# Patient Record
Sex: FEMALE | Race: OTHER | Employment: FULL TIME | URBAN - METROPOLITAN AREA
[De-identification: names, ages, dates, MRNs, and addresses within clinical notes are randomized per-mention and may not be internally consistent; named-entity substitution may affect disease eponyms.]

---

## 2018-02-28 ENCOUNTER — MED REC SCAN ONLY (OUTPATIENT)
Dept: INTERNAL MEDICINE CLINIC | Facility: CLINIC | Age: 48
End: 2018-02-28

## 2018-03-23 ENCOUNTER — HOSPITAL ENCOUNTER (OUTPATIENT)
Age: 48
Discharge: HOME | End: 2018-03-23
Attending: EMERGENCY MEDICINE
Payer: COMMERCIAL

## 2018-03-23 VITALS
RESPIRATION RATE: 20 BRPM | WEIGHT: 118 LBS | DIASTOLIC BLOOD PRESSURE: 63 MMHG | SYSTOLIC BLOOD PRESSURE: 130 MMHG | OXYGEN SATURATION: 98 % | TEMPERATURE: 98 F | HEART RATE: 75 BPM

## 2018-03-23 DIAGNOSIS — H81.10 BENIGN PAROXYSMAL POSITIONAL VERTIGO, UNSPECIFIED LATERALITY: Primary | ICD-10-CM

## 2018-03-23 PROCEDURE — 99204 OFFICE O/P NEW MOD 45 MIN: CPT

## 2018-03-23 PROCEDURE — 99203 OFFICE O/P NEW LOW 30 MIN: CPT

## 2018-03-23 RX ORDER — MECLIZINE HYDROCHLORIDE 25 MG/1
25 TABLET ORAL 3 TIMES DAILY PRN
Qty: 20 TABLET | Refills: 0 | Status: SHIPPED | OUTPATIENT
Start: 2018-03-23 | End: 2018-11-07

## 2018-03-23 NOTE — ED PROVIDER NOTES
Patient Seen in: [facility] In [address] [place] [address]    History   Patient presents with:  Nausea/Vomiting/Diarrhea (gastrointestinal)    Stated Complaint: nausea, vomiting, lt leg pain     HPI    44-year-old female patient presents her complaini injuries  Neuro: Normal speech, nonfocal examination and a symmetric motor, sensory, reflexes, cerebellar exam.  Normal gait and speech.   Psych: Appropriate, not agitated      ED Course   Labs Reviewed - No data to display    ED Course as of Mar 23 1031  -

## 2018-03-23 NOTE — ED INITIAL ASSESSMENT (HPI)
Pt with hx of vertigo reports dizziness (like the room is spinning) when moving head, nausea and one episode of vomiting this morning. Denies chest pain or shortness of breath.

## 2018-11-07 ENCOUNTER — OFFICE VISIT (OUTPATIENT)
Dept: INTERNAL MEDICINE CLINIC | Facility: CLINIC | Age: 48
End: 2018-11-07
Payer: COMMERCIAL

## 2018-11-07 VITALS
DIASTOLIC BLOOD PRESSURE: 69 MMHG | BODY MASS INDEX: 19.89 KG/M2 | SYSTOLIC BLOOD PRESSURE: 110 MMHG | WEIGHT: 119.38 LBS | TEMPERATURE: 98 F | HEIGHT: 65 IN | RESPIRATION RATE: 16 BRPM | HEART RATE: 88 BPM

## 2018-11-07 DIAGNOSIS — R74.8 ELEVATED LIVER ENZYMES: ICD-10-CM

## 2018-11-07 DIAGNOSIS — R10.11 RIGHT UPPER QUADRANT ABDOMINAL PAIN: ICD-10-CM

## 2018-11-07 DIAGNOSIS — Z00.00 ANNUAL PHYSICAL EXAM: Primary | ICD-10-CM

## 2018-11-07 PROCEDURE — 99396 PREV VISIT EST AGE 40-64: CPT | Performed by: INTERNAL MEDICINE

## 2018-11-07 NOTE — PROGRESS NOTES
Patient ID: Neyda Diana is a 50year old female. Patient presents with:  Physical       HISTORY OF PRESENT ILLNESS:   HPI  Patient presents for above. Here for annual physical and to discuss other medical problems.   Patient presents with her  wh strain: Not on file      Food insecurity - worry: Not on file      Food insecurity - inability: Not on file      Transportation needs - medical: Not on file      Transportation needs - non-medical: Not on file    Occupational History      Not on file    To abdominal pain  · US LIVER (CPT=76705); Future  · Prefer to hold off on CAT scan given she is childbearing age still. · Symptoms also do not appear concerning for serious pathology at this time.     Return in about 1 year (around 11/7/2019) for Complete ph

## 2018-11-07 NOTE — PATIENT INSTRUCTIONS
Prevention Guidelines, Women Ages 36 to 52  Screening tests and vaccines are an important part of managing your health. A screening test is done to find possible disorders or diseases in people who don't have any symptoms.  The goal is to find a disease e Depression All women in this age group At routine exams   Gonorrhea Sexually active women at increased risk for infection At routine exams   Hepatitis C Anyone at increased risk; 1 time for those born between [facility] At routine exams   High cholester Meningococcal Women at increased risk for infection–talk with your healthcare provider 1 or more doses   Pneumococcal conjugate vaccine (PCV13) and pneumococcal polysaccharide vaccine (PPSV23) Women at increased risk for infection–talk with your healthcare The exact cause of your abdominal (stomach) pain is not clear. This does not mean that this is something to worry about.  Everyone likes to know the exact cause of the problem, but sometimes with abdominal pain, there is no clear-cut cause, and this could b · Water is important so you do not get dehydrated. Soup may also be good. Sports drinks may also help, especially if they are not too acidic. Make sure you don't drink sugary drinks as this can make things worse. Take liquids in small amounts.  Do not guzzl © 9363-8689 [employer] [address]. [address] [facility], [facility] [place]. All rights reserved. This information is not intended as a substitute for professional medical care. Always follow your healthcare professional's instructions.

## 2019-03-05 ENCOUNTER — OFFICE VISIT (OUTPATIENT)
Dept: FAMILY MEDICINE CLINIC | Facility: CLINIC | Age: 49
End: 2019-03-05
Payer: COMMERCIAL

## 2019-03-05 VITALS
RESPIRATION RATE: 20 BRPM | TEMPERATURE: 101 F | HEIGHT: 61 IN | WEIGHT: 117 LBS | SYSTOLIC BLOOD PRESSURE: 120 MMHG | HEART RATE: 90 BPM | BODY MASS INDEX: 22.09 KG/M2 | DIASTOLIC BLOOD PRESSURE: 76 MMHG

## 2019-03-05 DIAGNOSIS — J06.9 ACUTE URI: ICD-10-CM

## 2019-03-05 PROCEDURE — 99213 OFFICE O/P EST LOW 20 MIN: CPT | Performed by: FAMILY MEDICINE

## 2019-03-05 PROCEDURE — 99212 OFFICE O/P EST SF 10 MIN: CPT | Performed by: FAMILY MEDICINE

## 2019-03-05 RX ORDER — AZITHROMYCIN 250 MG/1
TABLET, FILM COATED ORAL
Qty: 6 TABLET | Refills: 0 | Status: SHIPPED | OUTPATIENT
Start: 2019-03-05 | End: 2019-09-18

## 2019-03-05 NOTE — PROGRESS NOTES
HPI:    Patient ID: Breanna Armenta is a 52year old female. Pt presents with cold symptoms for 2-3 days. Pt has had cough, cold symptoms, sore throat, and fevers. Pt has tried otc remedies without relief. Pt states no sick contacts.            Review of Sy days.       Imaging & Referrals:  None       YQ#[id number]

## 2019-07-27 ENCOUNTER — HOSPITAL ENCOUNTER (OUTPATIENT)
Dept: MAMMOGRAPHY | Facility: HOSPITAL | Age: 49
Discharge: HOME | End: 2019-07-27
Attending: INTERNAL MEDICINE
Payer: COMMERCIAL

## 2019-07-27 DIAGNOSIS — Z00.00 ANNUAL PHYSICAL EXAM: ICD-10-CM

## 2019-07-27 PROCEDURE — 77067 SCR MAMMO BI INCL CAD: CPT | Performed by: INTERNAL MEDICINE

## 2019-07-27 PROCEDURE — 77063 BREAST TOMOSYNTHESIS BI: CPT | Performed by: INTERNAL MEDICINE

## 2019-07-30 ENCOUNTER — TELEPHONE (OUTPATIENT)
Dept: FAMILY MEDICINE CLINIC | Facility: CLINIC | Age: 49
End: 2019-07-30

## 2019-07-30 NOTE — TELEPHONE ENCOUNTER
Result Notes for [facility] MATILDA 2D+3D SCREENING BILAT (CPT=77067/99908)     Notes recorded by New Torres MD on 7/29/2019 at 3:17 PM CDT  Results normal/stable.  Continue present management.  Will recheck at follow-up visit.  ------    Notes recorded by Doris Albrecht

## 2019-07-30 NOTE — TELEPHONE ENCOUNTER
Pt  is requesting a call back from the nurse to discuss pt MAMMO results  is on [address] [place] [address]

## 2019-09-03 ENCOUNTER — NURSE TRIAGE (OUTPATIENT)
Dept: FAMILY MEDICINE CLINIC | Facility: CLINIC | Age: 49
End: 2019-09-03

## 2019-09-03 NOTE — TELEPHONE ENCOUNTER
Action Requested: Summary for Provider     []  Critical Lab, Recommendations Needed  [] Need Additional Advice  []   FYI    []   Need Orders  [] Need Medications Sent to Pharmacy  []  Other     SUMMARY:  Per protocol advised OV within 2 weeks.  Per ,

## 2019-09-18 ENCOUNTER — OFFICE VISIT (OUTPATIENT)
Dept: FAMILY MEDICINE CLINIC | Facility: CLINIC | Age: 49
End: 2019-09-18
Payer: COMMERCIAL

## 2019-09-18 VITALS
SYSTOLIC BLOOD PRESSURE: 106 MMHG | DIASTOLIC BLOOD PRESSURE: 71 MMHG | WEIGHT: 118 LBS | RESPIRATION RATE: 18 BRPM | HEART RATE: 61 BPM | BODY MASS INDEX: 23.16 KG/M2 | HEIGHT: 60 IN | TEMPERATURE: 98 F

## 2019-09-18 DIAGNOSIS — R10.11 RIGHT UPPER QUADRANT ABDOMINAL PAIN: Primary | ICD-10-CM

## 2019-09-18 PROCEDURE — 99213 OFFICE O/P EST LOW 20 MIN: CPT | Performed by: FAMILY MEDICINE

## 2019-09-18 NOTE — PROGRESS NOTES
HPI:    Patient ID: Lily Nolasco is a 52year old female. Pt presents with intermittent right upper abdominal pains usually at night that began a few months ago. No associated with eating. No injury or trauma. Pt states this occurs a few times per week. encounter.       Meds This Visit:  Requested Prescriptions      No prescriptions requested or ordered in this encounter       Imaging & Referrals:  GASTRO - INTERNAL  US ABDOMEN COMPLETE (CPT=76700)       IX#[id number]

## 2019-10-29 ENCOUNTER — TELEPHONE (OUTPATIENT)
Dept: FAMILY MEDICINE CLINIC | Facility: CLINIC | Age: 49
End: 2019-10-29

## 2019-10-29 NOTE — TELEPHONE ENCOUNTER
Yanna calling to check the status states she have not receive d the fax.       Patient is still there waiting

## 2019-10-29 NOTE — TELEPHONE ENCOUNTER
Janet Leon from [address] [place] [address] she stated the Patient is there for the [employer] and would like to know if the order can be faxed over to   [phone] site was told order would be faxed over

## 2019-11-07 ENCOUNTER — TELEPHONE (OUTPATIENT)
Dept: FAMILY MEDICINE CLINIC | Facility: CLINIC | Age: 49
End: 2019-11-07

## 2019-11-07 NOTE — TELEPHONE ENCOUNTER
Received blood testing from outside lab and tests were unremarkable. Pt contacted and discussed labs with  who will translate. Pt has follow up appt planned. Can discuss further.

## 2019-11-21 ENCOUNTER — OFFICE VISIT (OUTPATIENT)
Dept: FAMILY MEDICINE CLINIC | Facility: CLINIC | Age: 49
End: 2019-11-21
Payer: COMMERCIAL

## 2019-11-21 VITALS
SYSTOLIC BLOOD PRESSURE: 107 MMHG | DIASTOLIC BLOOD PRESSURE: 69 MMHG | WEIGHT: 119 LBS | RESPIRATION RATE: 18 BRPM | BODY MASS INDEX: 23.36 KG/M2 | TEMPERATURE: 98 F | HEIGHT: 60 IN | HEART RATE: 76 BPM

## 2019-11-21 DIAGNOSIS — N20.0 KIDNEY STONE: Primary | ICD-10-CM

## 2019-11-21 PROCEDURE — 99213 OFFICE O/P EST LOW 20 MIN: CPT | Performed by: FAMILY MEDICINE

## 2019-11-21 RX ORDER — NAPROXEN 500 MG/1
500 TABLET ORAL 2 TIMES DAILY WITH MEALS
Qty: 30 TABLET | Refills: 0 | Status: SHIPPED | OUTPATIENT
Start: 2019-11-21 | End: 2019-12-11 | Stop reason: ALTCHOICE

## 2019-11-21 NOTE — PROGRESS NOTES
HPI:    Patient ID: Tala Glavan is a 52year old female. Pt presents for follow up on her RUQ pains. Had u/s done at outside facility and is here to discuss results. Pt's u/s showed 2 stones of right kidney non-obstructing.   states pt has no sig

## 2019-12-11 ENCOUNTER — OFFICE VISIT (OUTPATIENT)
Dept: FAMILY MEDICINE CLINIC | Facility: CLINIC | Age: 49
End: 2019-12-11
Payer: COMMERCIAL

## 2019-12-11 VITALS
DIASTOLIC BLOOD PRESSURE: 71 MMHG | HEART RATE: 65 BPM | TEMPERATURE: 97 F | WEIGHT: 118 LBS | HEIGHT: 60 IN | SYSTOLIC BLOOD PRESSURE: 112 MMHG | BODY MASS INDEX: 23.16 KG/M2

## 2019-12-11 DIAGNOSIS — J06.9 UPPER RESPIRATORY INFECTION, ACUTE: Primary | ICD-10-CM

## 2019-12-11 PROCEDURE — 99213 OFFICE O/P EST LOW 20 MIN: CPT | Performed by: FAMILY MEDICINE

## 2019-12-11 RX ORDER — AZITHROMYCIN 250 MG/1
TABLET, FILM COATED ORAL
Qty: 6 TABLET | Refills: 0 | Status: SHIPPED | OUTPATIENT
Start: 2019-12-11 | End: 2020-03-21

## 2019-12-11 NOTE — PROGRESS NOTES
HPI:    Patient ID: Radha Tyson is a 52year old female. Patient with nasal congestion, cough with phlegm      Review of Systems   Constitutional: Positive for fatigue. Negative for activity change, chills and diaphoresis.    HENT: Positive for congesti

## 2020-03-21 ENCOUNTER — OFFICE VISIT (OUTPATIENT)
Dept: FAMILY MEDICINE CLINIC | Facility: CLINIC | Age: 50
End: 2020-03-21
Payer: COMMERCIAL

## 2020-03-21 VITALS
DIASTOLIC BLOOD PRESSURE: 77 MMHG | SYSTOLIC BLOOD PRESSURE: 143 MMHG | HEIGHT: 60 IN | RESPIRATION RATE: 20 BRPM | TEMPERATURE: 98 F | WEIGHT: 116 LBS | BODY MASS INDEX: 22.78 KG/M2 | HEART RATE: 101 BPM

## 2020-03-21 DIAGNOSIS — R11.2 NON-INTRACTABLE VOMITING WITH NAUSEA, UNSPECIFIED VOMITING TYPE: ICD-10-CM

## 2020-03-21 PROCEDURE — 99213 OFFICE O/P EST LOW 20 MIN: CPT | Performed by: FAMILY MEDICINE

## 2020-03-21 RX ORDER — ONDANSETRON 4 MG/1
4 TABLET, FILM COATED ORAL EVERY 8 HOURS PRN
Qty: 20 TABLET | Refills: 0 | Status: SHIPPED | OUTPATIENT
Start: 2020-03-21 | End: 2021-03-26

## 2020-03-21 NOTE — PROGRESS NOTES
HPI:    Patient ID: Vale Kelley is a 48year old female. Pt presents with nausea and episode of vomiting. Pt has had no fever/ diarrhea. Has had a moderate headache. Pt has not   been sleeping well and has been stressed.  No fevers and no sig abdominal

## 2020-04-04 ENCOUNTER — TELEPHONE (OUTPATIENT)
Dept: FAMILY MEDICINE CLINIC | Facility: CLINIC | Age: 50
End: 2020-04-04

## 2020-04-04 NOTE — TELEPHONE ENCOUNTER
Patient's , Adrianna Kelly is requesting a note from Dr. Sanna Melgar for an extension for patient to stay home from work until May 9th.  states he spoken to patient's employer regarding FMLA and employer states patient needs a letter to stay home from work. Please advise.

## 2020-04-06 NOTE — TELEPHONE ENCOUNTER
Message noted and letter generated as requested. Sent to patient via [facility]. Can notify pt's .

## 2020-04-13 ENCOUNTER — TELEPHONE (OUTPATIENT)
Dept: FAMILY MEDICINE CLINIC | Facility: CLINIC | Age: 50
End: 2020-04-13

## 2020-04-15 ENCOUNTER — MED REC SCAN ONLY (OUTPATIENT)
Dept: FAMILY MEDICINE CLINIC | Facility: CLINIC | Age: 50
End: 2020-04-15

## 2020-04-15 NOTE — TELEPHONE ENCOUNTER
Patients  calling to follow up on [facility] paperwork. Asking for a call to discuss. States paper work is not for [employer]" its for absence without pay.

## 2020-04-17 NOTE — TELEPHONE ENCOUNTER
[employer] FMLA form recvd and logged for processing (10-15 business day turnaround). Sent [employer] for HIPAA/fee.

## 2020-04-21 NOTE — TELEPHONE ENCOUNTER
Patient states the HIPPA release document was returned to Dr. Ronda Kapadia  And the FMLA forms must be faxed her the patient's employers office the last date of submission for her FMLA documents is 4/26/20

## 2020-04-23 ENCOUNTER — PATIENT MESSAGE (OUTPATIENT)
Dept: FAMILY MEDICINE CLINIC | Facility: CLINIC | Age: 50
End: 2020-04-23

## 2020-04-23 NOTE — TELEPHONE ENCOUNTER
Called spoke with pt in regards to fmla forms, notified pt hippa forms were received thru [facility], we do not have any fmla forms, pt states will come in today to drop forms off

## 2020-04-24 ENCOUNTER — MED REC SCAN ONLY (OUTPATIENT)
Dept: FAMILY MEDICINE CLINIC | Facility: CLINIC | Age: 50
End: 2020-04-24

## 2020-04-24 NOTE — TELEPHONE ENCOUNTER
From: Shane Ye  To: Sekou Mcguire MD  Sent: 4/23/2020 4:18 PM CDT  Subject: Other    hi  this is Shane Ye earlier today i dropped my FMLA documents at your office by mistake i put wrong date of my first day of vacation which needs to be corrected

## 2020-04-27 NOTE — TELEPHONE ENCOUNTER
Dr. Axel Skiff,    Please sign off on form: Disab 4/11/2020-5/9/2020  -Highlight the patient and hit \"Chart\" button. -In Chart Review, w/in the Encounter tab - click 1 time on the Telephone call encounter for 4/13/2020. Scroll down the telephone encounter.   -C

## 2020-05-06 ENCOUNTER — TELEPHONE (OUTPATIENT)
Dept: FAMILY MEDICINE CLINIC | Facility: CLINIC | Age: 50
End: 2020-05-06

## 2020-05-06 NOTE — TELEPHONE ENCOUNTER
Patient's , Pilo Phan, is requesting an extension for patient's leave from dates 05/11/2020 to 06/22/2020.  states extension needs to be faxed to [phone] ([facility]).

## 2020-05-07 ENCOUNTER — PATIENT MESSAGE (OUTPATIENT)
Dept: FAMILY MEDICINE CLINIC | Facility: CLINIC | Age: 50
End: 2020-05-07

## 2020-05-07 ENCOUNTER — TELEMEDICINE (OUTPATIENT)
Dept: FAMILY MEDICINE CLINIC | Facility: CLINIC | Age: 50
End: 2020-05-07

## 2020-05-07 ENCOUNTER — TELEPHONE (OUTPATIENT)
Dept: FAMILY MEDICINE CLINIC | Facility: CLINIC | Age: 50
End: 2020-05-07

## 2020-05-07 VITALS — SYSTOLIC BLOOD PRESSURE: 130 MMHG | TEMPERATURE: 98 F | DIASTOLIC BLOOD PRESSURE: 85 MMHG

## 2020-05-07 DIAGNOSIS — F41.9 ANXIETY: ICD-10-CM

## 2020-05-07 PROCEDURE — 99213 OFFICE O/P EST LOW 20 MIN: CPT | Performed by: FAMILY MEDICINE

## 2020-05-07 RX ORDER — ALPRAZOLAM 0.25 MG/1
0.25 TABLET ORAL NIGHTLY PRN
Qty: 30 TABLET | Refills: 0 | Status: SHIPPED | OUTPATIENT
Start: 2020-05-07

## 2020-05-07 NOTE — TELEPHONE ENCOUNTER
From: Maria G Wiggins  To: Matthias Bonilla MD  Sent: 5/7/2020 3:42 PM CDT  Subject: Other    I also need a copy of medical note that you fax to [employer] after today's e-visit.     Thanks  Maria G Wiggins

## 2020-05-07 NOTE — TELEPHONE ENCOUNTER
Message noted. Will fill out forms as requested when I am in office next. Will have our office staff address after forms filled out.

## 2020-05-07 NOTE — TELEPHONE ENCOUNTER
From: Sumi Brantley  To: Collin Hanna MD  Sent: 5/7/2020 3:33 PM CDT  Subject: Other    Hi  I just left a form to fill and sign at your office regarding FMLA extension I will collect as soon as you fill it.  Please give a call @ [phone].  can you plea

## 2020-05-07 NOTE — PROGRESS NOTES
HPI:    Patient ID: Carolyn Siegel is a 48year old female. This visit is conducted using Telemedicine with live, interactive video and audio during this Coronavirus pandemic.     Please note that the following visit was completed using two-way, real-time and headaches. Psychiatric/Behavioral: Positive for sleep disturbance. Negative for agitation, behavioral problems, decreased concentration and dysphoric mood. The patient is nervous/anxious.              Current Outpatient Medications   Medication Santy Willams

## 2020-05-19 NOTE — PROGRESS NOTES
HPI:    Patient ID: Carolyn Siegel is a 48year old female. Pt presents with some intermittent feeling of vibration of her left foot over the last few weeks. No pains. Pt denies any trauma or injury. Pt had vomiting 3 times yesterday. No diarrhea.  No fev , recommended follow up with psychiatry for further evaluation and treatment; To call if any significant symptoms. Can take alprazolam as needed for now.     Nausea and vomiting, intractability of vomiting not specified, unspecified vomiting type:  -

## 2020-07-08 ENCOUNTER — MED REC SCAN ONLY (OUTPATIENT)
Dept: FAMILY MEDICINE CLINIC | Facility: CLINIC | Age: 50
End: 2020-07-08

## 2020-07-29 ENCOUNTER — TELEPHONE (OUTPATIENT)
Dept: FAMILY MEDICINE CLINIC | Facility: CLINIC | Age: 50
End: 2020-07-29

## 2020-07-29 DIAGNOSIS — Z12.31 SCREENING MAMMOGRAM, ENCOUNTER FOR: Primary | ICD-10-CM

## 2020-07-29 NOTE — TELEPHONE ENCOUNTER
Mammogram order signed as requested;  Follow up and further management after testing  Pt notified through [facility]

## 2020-07-29 NOTE — TELEPHONE ENCOUNTER
Dr. Arian Morley, please advise on pending mammogram order. Thanks. Spouse in HIPAA. Last mammogram: 11/2018    =====  CONCLUSION:       BI-RADS CATEGORY:     DIAGNOSTIC CATEGORY 2--BENIGN FINDING NO CHANGE FROM COMPARISON ASSESSMENT.        RECOMMENDATIONS:

## 2020-07-29 NOTE — TELEPHONE ENCOUNTER
Spouse, would like to know if the doctor can give the patient an order for her mammogram. Please, call when the order is in the system.

## 2020-08-10 ENCOUNTER — HOSPITAL ENCOUNTER (OUTPATIENT)
Dept: MAMMOGRAPHY | Facility: HOSPITAL | Age: 50
Discharge: HOME | End: 2020-08-10
Attending: FAMILY MEDICINE
Payer: COMMERCIAL

## 2020-08-10 DIAGNOSIS — Z12.31 SCREENING MAMMOGRAM, ENCOUNTER FOR: ICD-10-CM

## 2020-08-10 PROCEDURE — 77067 SCR MAMMO BI INCL CAD: CPT | Performed by: FAMILY MEDICINE

## 2020-08-10 PROCEDURE — 77063 BREAST TOMOSYNTHESIS BI: CPT | Performed by: FAMILY MEDICINE

## 2020-08-21 ENCOUNTER — TELEPHONE (OUTPATIENT)
Dept: FAMILY MEDICINE CLINIC | Facility: CLINIC | Age: 50
End: 2020-08-21

## 2020-08-21 ENCOUNTER — NURSE TRIAGE (OUTPATIENT)
Dept: FAMILY MEDICINE CLINIC | Facility: CLINIC | Age: 50
End: 2020-08-21

## 2020-08-21 NOTE — TELEPHONE ENCOUNTER
Pt scheduled appt via [employer] stating     stiff neck or  pain on back of neck since last month        please advise

## 2020-08-21 NOTE — TELEPHONE ENCOUNTER
Attempt made to contact patient; patient was unavailable, left message instructing patient to return call to the clinic. [employer] message was sent to the patient as well.

## 2020-08-21 NOTE — TELEPHONE ENCOUNTER
Action Requested: Summary for Provider     []  Critical Lab, Recommendations Needed  [] Need Additional Advice  [x]   FYI    []   Need Orders  [] Need Medications Sent to Pharmacy  []  Other     SUMMARY: Patient calling with her  on the phone .    Pe

## 2020-08-24 ENCOUNTER — OFFICE VISIT (OUTPATIENT)
Dept: FAMILY MEDICINE CLINIC | Facility: CLINIC | Age: 50
End: 2020-08-24
Payer: COMMERCIAL

## 2020-08-24 VITALS
SYSTOLIC BLOOD PRESSURE: 118 MMHG | HEIGHT: 60.3 IN | BODY MASS INDEX: 21.7 KG/M2 | TEMPERATURE: 98 F | WEIGHT: 112 LBS | HEART RATE: 90 BPM | RESPIRATION RATE: 18 BRPM | DIASTOLIC BLOOD PRESSURE: 71 MMHG

## 2020-08-24 DIAGNOSIS — M54.2 NECK PAIN: Primary | ICD-10-CM

## 2020-08-24 PROCEDURE — 3008F BODY MASS INDEX DOCD: CPT | Performed by: FAMILY MEDICINE

## 2020-08-24 PROCEDURE — 99213 OFFICE O/P EST LOW 20 MIN: CPT | Performed by: FAMILY MEDICINE

## 2020-08-24 PROCEDURE — 3074F SYST BP LT 130 MM HG: CPT | Performed by: FAMILY MEDICINE

## 2020-08-24 PROCEDURE — 3078F DIAST BP <80 MM HG: CPT | Performed by: FAMILY MEDICINE

## 2020-08-24 NOTE — PROGRESS NOTES
HPI:    Patient ID: Zoya Wyatt is a 48year old female. Pt presents with pain of the back of the neck over the last month. Pt states feels tension and pressure of the neck sometimes. No sig stress or anxiety.  Pt states worse on right side also leads t

## 2020-09-03 ENCOUNTER — HOSPITAL ENCOUNTER (OUTPATIENT)
Dept: GENERAL RADIOLOGY | Facility: HOSPITAL | Age: 50
Discharge: HOME | End: 2020-09-03
Attending: FAMILY MEDICINE
Payer: COMMERCIAL

## 2020-09-03 DIAGNOSIS — M54.2 NECK PAIN: ICD-10-CM

## 2020-09-03 PROCEDURE — 72050 X-RAY EXAM NECK SPINE 4/5VWS: CPT | Performed by: FAMILY MEDICINE

## 2020-09-18 ENCOUNTER — PATIENT MESSAGE (OUTPATIENT)
Dept: FAMILY MEDICINE CLINIC | Facility: CLINIC | Age: 50
End: 2020-09-18

## 2020-09-19 ENCOUNTER — LAB ENCOUNTER (OUTPATIENT)
Dept: LAB | Facility: HOSPITAL | Age: 50
End: 2020-09-19
Attending: FAMILY MEDICINE
Payer: COMMERCIAL

## 2020-09-19 ENCOUNTER — OFFICE VISIT (OUTPATIENT)
Dept: FAMILY MEDICINE CLINIC | Facility: CLINIC | Age: 50
End: 2020-09-19
Payer: COMMERCIAL

## 2020-09-19 VITALS
SYSTOLIC BLOOD PRESSURE: 125 MMHG | DIASTOLIC BLOOD PRESSURE: 82 MMHG | HEIGHT: 60.6 IN | BODY MASS INDEX: 20.85 KG/M2 | RESPIRATION RATE: 18 BRPM | TEMPERATURE: 97 F | HEART RATE: 78 BPM | WEIGHT: 109 LBS

## 2020-09-19 DIAGNOSIS — Z00.00 ROUTINE PHYSICAL EXAMINATION: Primary | ICD-10-CM

## 2020-09-19 DIAGNOSIS — Z12.11 COLON CANCER SCREENING: ICD-10-CM

## 2020-09-19 DIAGNOSIS — Z00.00 ROUTINE PHYSICAL EXAMINATION: ICD-10-CM

## 2020-09-19 LAB
ALBUMIN SERPL-MCNC: 4.3 G/DL (ref 3.4–5)
ALBUMIN/GLOB SERPL: 1.2 {RATIO} (ref 1–2)
ALP LIVER SERPL-CCNC: 67 U/L
ALT SERPL-CCNC: 26 U/L
ANION GAP SERPL CALC-SCNC: 5 MMOL/L (ref 0–18)
AST SERPL-CCNC: 19 U/L (ref 15–37)
BILIRUB SERPL-MCNC: 1.6 MG/DL (ref 0.1–2)
BUN BLD-MCNC: 16 MG/DL (ref 7–18)
BUN/CREAT SERPL: 20.3 (ref 10–20)
CALCIUM BLD-MCNC: 10 MG/DL (ref 8.5–10.1)
CHLORIDE SERPL-SCNC: 103 MMOL/L (ref 98–112)
CHOLEST SMN-MCNC: 165 MG/DL (ref ?–200)
CO2 SERPL-SCNC: 31 MMOL/L (ref 21–32)
CREAT BLD-MCNC: 0.79 MG/DL
DEPRECATED RDW RBC AUTO: 39.8 FL (ref 35.1–46.3)
ERYTHROCYTE [DISTWIDTH] IN BLOOD BY AUTOMATED COUNT: 12.8 % (ref 11–15)
GLOBULIN PLAS-MCNC: 3.6 G/DL (ref 2.8–4.4)
GLUCOSE BLD-MCNC: 85 MG/DL (ref 70–99)
HCT VFR BLD AUTO: 45.6 %
HDLC SERPL-MCNC: 76 MG/DL (ref 40–59)
HGB BLD-MCNC: 14.9 G/DL
LDLC SERPL CALC-MCNC: 72 MG/DL (ref ?–100)
M PROTEIN MFR SERPL ELPH: 7.9 G/DL (ref 6.4–8.2)
MCH RBC QN AUTO: 28.2 PG (ref 26–34)
MCHC RBC AUTO-ENTMCNC: 32.7 G/DL (ref 31–37)
MCV RBC AUTO: 86.2 FL
NONHDLC SERPL-MCNC: 89 MG/DL (ref ?–130)
OSMOLALITY SERPL CALC.SUM OF ELEC: 288 MOSM/KG (ref 275–295)
PATIENT FASTING Y/N/NP: YES
PATIENT FASTING Y/N/NP: YES
PLATELET # BLD AUTO: 244 10(3)UL (ref 150–450)
POTASSIUM SERPL-SCNC: 4.4 MMOL/L (ref 3.5–5.1)
RBC # BLD AUTO: 5.29 X10(6)UL
SODIUM SERPL-SCNC: 139 MMOL/L (ref 136–145)
TRIGL SERPL-MCNC: 83 MG/DL (ref 30–149)
TSI SER-ACNC: 0.53 MIU/ML (ref 0.36–3.74)
VLDLC SERPL CALC-MCNC: 17 MG/DL (ref 0–30)
WBC # BLD AUTO: 5.5 X10(3) UL (ref 4–11)

## 2020-09-19 PROCEDURE — 3079F DIAST BP 80-89 MM HG: CPT | Performed by: FAMILY MEDICINE

## 2020-09-19 PROCEDURE — 80061 LIPID PANEL: CPT

## 2020-09-19 PROCEDURE — 3074F SYST BP LT 130 MM HG: CPT | Performed by: FAMILY MEDICINE

## 2020-09-19 PROCEDURE — 85027 COMPLETE CBC AUTOMATED: CPT

## 2020-09-19 PROCEDURE — 80053 COMPREHEN METABOLIC PANEL: CPT

## 2020-09-19 PROCEDURE — 84443 ASSAY THYROID STIM HORMONE: CPT

## 2020-09-19 PROCEDURE — 36415 COLL VENOUS BLD VENIPUNCTURE: CPT

## 2020-09-19 PROCEDURE — 3008F BODY MASS INDEX DOCD: CPT | Performed by: FAMILY MEDICINE

## 2020-09-19 PROCEDURE — 99396 PREV VISIT EST AGE 40-64: CPT | Performed by: FAMILY MEDICINE

## 2020-09-19 NOTE — TELEPHONE ENCOUNTER
From: Tulio Still  To: Audelia Dixon MD  Sent: 9/18/2020 5:53 PM CDT  Subject: Other    i have setup appointment i.e 09/19/2020 at 11:40 am for annual physical but i Annemarie Loop do my lab test as well.   because i talked to my insurance company they said you ha

## 2020-09-19 NOTE — PROGRESS NOTES
HPI:    Patient ID: Sumi Brantley is a 48year old female. Patient is here for routine physical exam. No acute issues. No significant chronic medical problems. Patient is requesting blood testing. Diet and exercise have been good.  Past medical history, breath sounds normal. No respiratory distress. She has no wheezes. She has no rales. Abdominal: Soft. Bowel sounds are normal. She exhibits no distension. There is no abdominal tenderness. There is no rebound. Musculoskeletal: Normal range of motion.

## 2020-10-23 ENCOUNTER — OFFICE VISIT (OUTPATIENT)
Dept: GASTROENTEROLOGY | Facility: CLINIC | Age: 50
End: 2020-10-23
Payer: COMMERCIAL

## 2020-10-23 VITALS
HEART RATE: 81 BPM | DIASTOLIC BLOOD PRESSURE: 73 MMHG | BODY MASS INDEX: 21.42 KG/M2 | HEIGHT: 60.6 IN | SYSTOLIC BLOOD PRESSURE: 107 MMHG | WEIGHT: 112 LBS

## 2020-10-23 DIAGNOSIS — F41.9 ANXIETY: ICD-10-CM

## 2020-10-23 DIAGNOSIS — R14.0 ABDOMINAL BLOATING: Primary | ICD-10-CM

## 2020-10-23 DIAGNOSIS — K59.09 CHRONIC CONSTIPATION: ICD-10-CM

## 2020-10-23 PROCEDURE — 3008F BODY MASS INDEX DOCD: CPT | Performed by: INTERNAL MEDICINE

## 2020-10-23 PROCEDURE — 3074F SYST BP LT 130 MM HG: CPT | Performed by: INTERNAL MEDICINE

## 2020-10-23 PROCEDURE — 3078F DIAST BP <80 MM HG: CPT | Performed by: INTERNAL MEDICINE

## 2020-10-23 PROCEDURE — 99244 OFF/OP CNSLTJ NEW/EST MOD 40: CPT | Performed by: INTERNAL MEDICINE

## 2020-10-23 NOTE — PATIENT INSTRUCTIONS
1. Start Linzess tablet once a day    2. If linzess is too expensive, start over the counter miralax powder 1 capful with water daily, and add dulcolax tablet 1-2x a week     3.  When ready, can consider colonoscopy and endoscopy

## 2020-10-23 NOTE — H&P
[facility] [address] Gastroenterology                                                                                                               Reason for consult: B kg)  12/11/19 : 118 lb (53.5 kg)       History, Medications, Allergies, ROS:      Past Medical History:   Diagnosis Date   • Vertigo       History reviewed. No pertinent surgical history. Family Hx: History reviewed. No pertinent family history.    Social edema is evident. Neuro- Alert and interactive, and gross movements of extremities normal    Labs/Imaging:     Patient's pertinent labs and imaging were reviewed and discussed with patient today.       .  ASSESSMENT/PLAN:   Breanna Armenta is a 48year old y Take 72 mcg by mouth daily. Imaging & Referrals:  None         KT Tapia MD  Pager: [phone]  10/23/2020        This note was partially prepared using [employer] voice recognition dictation software. As a result, errors may occur.  When

## 2020-10-26 ENCOUNTER — PATIENT MESSAGE (OUTPATIENT)
Dept: FAMILY MEDICINE CLINIC | Facility: CLINIC | Age: 50
End: 2020-10-26

## 2020-10-27 NOTE — TELEPHONE ENCOUNTER
From: Carolyn Siegel  To: Sridhar Calderon MD  Sent: 10/26/2020 7:46 PM CDT  Subject: Visit Follow-up Question    HI  THIS IS Allayne Males I VISITED DR. No Blair (GASTROENTROLOGY) FOR MY PROBLEM. I'M TAKING AS HE PRESCRIBED.  BUT I NEED YOUR FAVOUR CAN U W

## 2020-10-27 NOTE — TELEPHONE ENCOUNTER
Message noted and letter generated as requested. Sent to patient via [facility]. Pt notified.

## 2020-10-29 ENCOUNTER — PATIENT MESSAGE (OUTPATIENT)
Dept: GASTROENTEROLOGY | Facility: CLINIC | Age: 50
End: 2020-10-29

## 2020-10-29 RX ORDER — LINACLOTIDE 145 UG/1
1 CAPSULE, GELATIN COATED ORAL DAILY
Qty: 30 CAPSULE | Refills: 0 | Status: SHIPPED | OUTPATIENT
Start: 2020-10-29 | End: 2020-11-28

## 2020-10-29 NOTE — TELEPHONE ENCOUNTER
Dr. Jovani Lemos-    Please advise on pt message below. Last seen for OV on 10/23/2020 for constipation and states sxs unrelieved on Linzess 72 mcg. Please advise, thank you.

## 2020-10-29 NOTE — TELEPHONE ENCOUNTER
From: Celia Quintero  To: Mariah Hernandez MD  Sent: 10/29/2020 7:02 AM CDT  Subject: Visit Follow-up Question    hi  This is Celia Quintero visited your clinic recently i feel better with my sleep but there is no progress with my conniption rather i would say

## 2020-11-16 ENCOUNTER — VIRTUAL PHONE E/M (OUTPATIENT)
Dept: FAMILY MEDICINE CLINIC | Facility: CLINIC | Age: 50
End: 2020-11-16
Payer: COMMERCIAL

## 2020-11-16 ENCOUNTER — LAB ENCOUNTER (OUTPATIENT)
Dept: LAB | Age: 50
End: 2020-11-16
Attending: FAMILY MEDICINE
Payer: COMMERCIAL

## 2020-11-16 DIAGNOSIS — R50.9 FEVER, UNSPECIFIED FEVER CAUSE: Primary | ICD-10-CM

## 2020-11-16 DIAGNOSIS — Z20.822 EXPOSURE TO COVID-19 VIRUS: Primary | ICD-10-CM

## 2020-11-16 PROCEDURE — 99213 OFFICE O/P EST LOW 20 MIN: CPT | Performed by: FAMILY MEDICINE

## 2020-11-16 NOTE — PROGRESS NOTES
HPI:    Patient ID: Ameena Banks is a 48year old female. Virtual Telephone Check-In    Ameena Banks verbally consents to a Virtual/Telephone Check-In visit on 11/16/20.   Patient has been referred to the [facility] website at [email or web address] to revdomingo symptoms. Follow up and further management after testing. To practice self isolation and quarantining as discussed. To continue social distancing and good hygiene. Patient verbalized understanding of recommendations and agrees to plan.   Note for work provi

## 2020-11-21 ENCOUNTER — TELEPHONE (OUTPATIENT)
Dept: FAMILY MEDICINE CLINIC | Facility: CLINIC | Age: 50
End: 2020-11-21

## 2020-11-21 ENCOUNTER — PATIENT MESSAGE (OUTPATIENT)
Dept: FAMILY MEDICINE CLINIC | Facility: CLINIC | Age: 50
End: 2020-11-21

## 2020-11-22 NOTE — TELEPHONE ENCOUNTER
Dr Rimma Mcgill and office staff=please see below.     From: Blaise Hartmann  To: Nestor Goodpasture, MD  Sent: 11/21/2020  2:07 PM CST  Subject: Other    hi  i was supposed to submit my blood test  results to my work place as i can't go out for another blood test because

## 2020-11-23 ENCOUNTER — TELEPHONE (OUTPATIENT)
Dept: FAMILY MEDICINE CLINIC | Facility: CLINIC | Age: 50
End: 2020-11-23

## 2020-11-23 NOTE — TELEPHONE ENCOUNTER
[employer] disab forms logged. Has HIPAA from 4/2020 but #4 is her spouse not Disab info. Will send another HIPAA release to pt's [employer].

## 2020-11-23 NOTE — TELEPHONE ENCOUNTER
Patient Health Provider form fax to .   Princess Simpson was given to patient  and a copy sent for scanning,

## 2020-11-23 NOTE — TELEPHONE ENCOUNTER
Triage team will monitor patient . Please DO NOT close this encounter    What was your temp today? - 97.5    How did you take your temp?     with an oral thermometer    Are you feeling short of breath today?    No      Is the shortness of breath better, th

## 2020-11-25 ENCOUNTER — PATIENT MESSAGE (OUTPATIENT)
Dept: FAMILY MEDICINE CLINIC | Facility: CLINIC | Age: 50
End: 2020-11-25

## 2020-11-25 NOTE — TELEPHONE ENCOUNTER
From: Hasmukh Mendez  To: Kyung Olivera MD  Sent: 11/25/2020 3:41 PM CST  Subject: Other    hi  find here the attached updated form for short term disability. if you have any questions pls call.      best regards,  Hasmukh Mendez  [phone]

## 2020-11-30 ENCOUNTER — TELEPHONE (OUTPATIENT)
Dept: FAMILY MEDICINE CLINIC | Facility: CLINIC | Age: 50
End: 2020-11-30

## 2020-11-30 NOTE — TELEPHONE ENCOUNTER
Dr. Shanique Morejon,     Please sign off on form: Disability  -Highlight the patient and hit \"Chart\" button.   -In Chart Review, w/in the Encounter tab - click 1 time on the Telephone call encounter for 11/23/20 Scroll down the telephone encounter.  -Click \"scan on\

## 2020-12-03 ENCOUNTER — TELEPHONE (OUTPATIENT)
Dept: FAMILY MEDICINE CLINIC | Facility: CLINIC | Age: 50
End: 2020-12-03

## 2020-12-03 NOTE — TELEPHONE ENCOUNTER
Dr. Jovany Scott recdez [employer] disability form was email to the [place]/ Disability det @ [email or web address].   The original was placed in [address] [place] [address]

## 2020-12-05 ENCOUNTER — PATIENT MESSAGE (OUTPATIENT)
Dept: GASTROENTEROLOGY | Facility: CLINIC | Age: 50
End: 2020-12-05

## 2020-12-07 ENCOUNTER — TELEPHONE (OUTPATIENT)
Dept: FAMILY MEDICINE CLINIC | Facility: CLINIC | Age: 50
End: 2020-12-07

## 2020-12-07 NOTE — TELEPHONE ENCOUNTER
From: Guadalupe Martino  To: Ayo Mustafa MD  Sent: 12/5/2020 10:45 AM CST  Subject: Prescription Question    Hi   I have been taking Linzess 145mcg from last two weeks but since day one I didn't feel any improvement in my problem.  I'm also taking Melatonin

## 2020-12-07 NOTE — TELEPHONE ENCOUNTER
Dr. Rios Torrez disability insurance form. Form e-mail to [email or web address] and the original was placed in [facility]

## 2020-12-10 ENCOUNTER — TELEPHONE (OUTPATIENT)
Dept: GASTROENTEROLOGY | Facility: CLINIC | Age: 50
End: 2020-12-10

## 2020-12-10 DIAGNOSIS — R11.10 VOMITING, INTRACTABILITY OF VOMITING NOT SPECIFIED, PRESENCE OF NAUSEA NOT SPECIFIED, UNSPECIFIED VOMITING TYPE: ICD-10-CM

## 2020-12-10 DIAGNOSIS — Z12.11 COLON CANCER SCREENING: Primary | ICD-10-CM

## 2020-12-10 DIAGNOSIS — R14.0 BLOATING: ICD-10-CM

## 2020-12-10 NOTE — TELEPHONE ENCOUNTER
Pt's  states the medication she is taking is not working and  suggested pt gets a colonoscopy.  Please call

## 2020-12-10 NOTE — TELEPHONE ENCOUNTER
Please schedule: EGD/CLN with MAC. Please pend suprep bowel prep.      Diagnosis: screening, bloating, vomiting    Medication adjustments:  Day before procedure, hold: none  Day of procedure, hold: none

## 2020-12-10 NOTE — TELEPHONE ENCOUNTER
Dr. David Long-    Please see pt mychart response from 12/5/2020 and advise on CLN/EGD orders, thank you.

## 2020-12-10 NOTE — TELEPHONE ENCOUNTER
GI Schedulers-    Please schedule pt as per Dr. Barbara Nunn orders below. He is advising to complete BOTH CLN/EGD, thank you!

## 2020-12-11 NOTE — TELEPHONE ENCOUNTER
I spoke with this patient  and gave him the earliest date to schedule which he stated that he will have to talk to her to see if she can schedule on that date.  I did inform him that I could not guarantee that the date will be available which he verb

## 2020-12-15 ENCOUNTER — PATIENT MESSAGE (OUTPATIENT)
Dept: GASTROENTEROLOGY | Facility: CLINIC | Age: 50
End: 2020-12-15

## 2020-12-17 NOTE — TELEPHONE ENCOUNTER
From: Radha Tyson  To: Erika Olsen MD  Sent: 12/15/2020 9:37 PM CST  Subject: Prescription Question    Hi   I'm out of medicine [facility] that [facility] can [facility]. Right now I was taking linzess 145 mcg but I need heavy dose.

## 2020-12-17 NOTE — TELEPHONE ENCOUNTER
Dr. Yvette Villafana-     Please refer to mychart from 12/5/2020. If appropriate, please review and sign pended Linzess order, thank you.

## 2020-12-18 RX ORDER — SODIUM, POTASSIUM,MAG SULFATES 17.5-3.13G
SOLUTION, RECONSTITUTED, ORAL ORAL
Qty: 1 BOTTLE | Refills: 0 | Status: SHIPPED | OUTPATIENT
Start: 2020-12-18 | End: 2021-04-21

## 2020-12-18 NOTE — TELEPHONE ENCOUNTER
Dr. Mahendra Ervin--    This patients  scheduled the patients procedure which he stated that she only wanted the colonoscopy. Please send her Suprep to the pharmacy on file.  Thank you    You may close this TE when done :)

## 2020-12-18 NOTE — TELEPHONE ENCOUNTER
Scheduled for:  Colonoscopy 05188  Provider Name:  Dr. Wiley Hernandez  Date:  2/24/21  Location:    [facility]  Sedation:  MAC  Time:  1548 (pt is aware to arrive at 0815)   Prep:  Suprep, sent via [employer] [facility]/Allergies Reconciled?:  Physician reviewed   Diagnosis with

## 2020-12-22 RX ORDER — LINACLOTIDE 145 UG/1
CAPSULE, GELATIN COATED ORAL
Qty: 30 CAPSULE | Refills: 0 | OUTPATIENT
Start: 2020-12-22

## 2021-01-07 ENCOUNTER — NURSE TRIAGE (OUTPATIENT)
Dept: FAMILY MEDICINE CLINIC | Facility: CLINIC | Age: 51
End: 2021-01-07

## 2021-01-07 NOTE — TELEPHONE ENCOUNTER
Patient's spouse Taryn Elias (on KEISHA) called to make an appointment for tomorrow evening for patient having left ear pain for past 2-3 days. Patient is at work and not available to triage.  Advised spouse he can take patient to [facility], there are extended

## 2021-02-08 ENCOUNTER — PATIENT MESSAGE (OUTPATIENT)
Dept: GASTROENTEROLOGY | Facility: CLINIC | Age: 51
End: 2021-02-08

## 2021-02-08 NOTE — TELEPHONE ENCOUNTER
From: Lily Nolasco  To: Riley Najera MD  Sent: 2/8/2021 2:34 PM CST  Subject: Other    hi  This is Lily Nolasco   I'm scheduled for colonoscopy on 24th of feb. 2021.  i have few questions as under:-  1. Do i need to have corona virus test before my visit

## 2021-02-20 ENCOUNTER — PATIENT MESSAGE (OUTPATIENT)
Dept: GASTROENTEROLOGY | Facility: CLINIC | Age: 51
End: 2021-02-20

## 2021-02-22 ENCOUNTER — TELEPHONE (OUTPATIENT)
Dept: GASTROENTEROLOGY | Facility: CLINIC | Age: 51
End: 2021-02-22

## 2021-02-22 DIAGNOSIS — R14.0 BLOATING: ICD-10-CM

## 2021-02-22 DIAGNOSIS — R11.10 VOMITING, INTRACTABILITY OF VOMITING NOT SPECIFIED, PRESENCE OF NAUSEA NOT SPECIFIED, UNSPECIFIED VOMITING TYPE: ICD-10-CM

## 2021-02-22 DIAGNOSIS — Z12.11 COLON CANCER SCREENING: Primary | ICD-10-CM

## 2021-02-22 NOTE — TELEPHONE ENCOUNTER
From: Blaise Hartmann  To: Shayy Ma MD  Sent: 2/20/2021 2:05 PM CST  Subject: Other    hi  This is Blaise Hartmann.  i have to go somewhere very very urgently on 24th feb. 2021  so pls cancel my upcoming appointment scheduled on 24th feb 2021 @ 8:00 am   i

## 2021-02-22 NOTE — TELEPHONE ENCOUNTER
See telephone encounter from today 2/22/2021. Patient wants to reschedule procedure due to personal reasons.

## 2021-02-22 NOTE — TELEPHONE ENCOUNTER
I removed patient from appointment desk. Shawn Whiting from PAT endoscopy already canceled patient from their end per Epic. See telephone encounter from 2/22/2021 that was routed to schedulers to help patient reschedule.

## 2021-02-27 NOTE — TELEPHONE ENCOUNTER
I spoke with this patients  (on KEISHA) regarding scheduling this patients procedure but she was at work which I gave him a date and he will discuss this with her and will CB on Monday.  I did inform him that I could not guarantee that this date of 4/14

## 2021-03-10 NOTE — TELEPHONE ENCOUNTER
CB to schedule procedure but NA the a busy signal. Please transfer to [facility] in GI

## 2021-03-11 NOTE — TELEPHONE ENCOUNTER
Scheduled for:  Colonoscopy 95396  Provider Name:  Dr. Bryce Mendes  Date:  5/12/21  Location:    [facility]  Sedation:  MAC  Time:  1000 (pt is aware to arrive at 0900)  Prep:  Suprep, sent new instructions via Mychart  Meds/Allergies Reconciled?:  Physician reviewed

## 2021-03-11 NOTE — TELEPHONE ENCOUNTER
Dr. Td Ríos--    This patients  CB to schedule her procedure. Please advise if I can use one of your MD Approvals on either 5/11 or 5/12 at 0900.  Thank you

## 2021-03-24 ENCOUNTER — IMMUNIZATION (OUTPATIENT)
Dept: LAB | Facility: HOSPITAL | Age: 51
End: 2021-03-24
Attending: HOSPITALIST
Payer: COMMERCIAL

## 2021-03-24 DIAGNOSIS — Z23 NEED FOR VACCINATION: Primary | ICD-10-CM

## 2021-03-24 PROCEDURE — 0011A SARSCOV2 VAC 100MCG/0.5ML IM: CPT

## 2021-03-25 ENCOUNTER — NURSE TRIAGE (OUTPATIENT)
Dept: FAMILY MEDICINE CLINIC | Facility: CLINIC | Age: 51
End: 2021-03-25

## 2021-03-25 ENCOUNTER — PATIENT MESSAGE (OUTPATIENT)
Dept: FAMILY MEDICINE CLINIC | Facility: CLINIC | Age: 51
End: 2021-03-25

## 2021-03-25 NOTE — TELEPHONE ENCOUNTER
From: Yessica Marrero  To: Reza Nesbitt MD  Sent: 3/25/2021 10:15 AM CDT  Subject: Prescription Question    hi  good morning. i'm running out of ondansetron HCL 4mg tablet.  i need a refill so really appreciate if u can [facility] just in case i need in

## 2021-03-25 NOTE — TELEPHONE ENCOUNTER
----- Message from Abimael Mcadams sent at 3/25/2021  1:34 PM CDT -----  Regarding: RE: Prescription Question  Contact: [phone]  i got my first covid vaccine shot yesterday.  i can not sleep.  i used to have melatonin 3mg in routine for sleep but last nig

## 2021-03-25 NOTE — TELEPHONE ENCOUNTER
Action Requested: Summary for Provider     []  Critical Lab, Recommendations Needed  [] Need Additional Advice  []   FYI    []   Need Orders  [x] Need Medications Sent to Pharmacy  []  Other     SUMMARY: Per protocol advised home care.   Patient requesting

## 2021-03-26 RX ORDER — ONDANSETRON 4 MG/1
4 TABLET, FILM COATED ORAL EVERY 8 HOURS PRN
Qty: 20 TABLET | Refills: 0 | Status: SHIPPED | OUTPATIENT
Start: 2021-03-26 | End: 2021-04-02

## 2021-03-27 NOTE — TELEPHONE ENCOUNTER
Message noted. May refill zofran as requested. Erx sent to listed pharmacy.  To follow up for appointment if not better; Please notify patient

## 2021-04-21 ENCOUNTER — OFFICE VISIT (OUTPATIENT)
Dept: FAMILY MEDICINE CLINIC | Facility: CLINIC | Age: 51
End: 2021-04-21
Payer: COMMERCIAL

## 2021-04-21 ENCOUNTER — IMMUNIZATION (OUTPATIENT)
Dept: LAB | Facility: HOSPITAL | Age: 51
End: 2021-04-21
Attending: EMERGENCY MEDICINE
Payer: COMMERCIAL

## 2021-04-21 VITALS
SYSTOLIC BLOOD PRESSURE: 115 MMHG | TEMPERATURE: 98 F | DIASTOLIC BLOOD PRESSURE: 73 MMHG | WEIGHT: 117 LBS | HEIGHT: 63 IN | HEART RATE: 74 BPM | RESPIRATION RATE: 18 BRPM | BODY MASS INDEX: 20.73 KG/M2

## 2021-04-21 DIAGNOSIS — Z23 NEED FOR VACCINATION: Primary | ICD-10-CM

## 2021-04-21 DIAGNOSIS — H92.03 OTALGIA OF BOTH EARS: Primary | ICD-10-CM

## 2021-04-21 PROCEDURE — 0012A SARSCOV2 VAC 100MCG/0.5ML IM: CPT

## 2021-04-21 PROCEDURE — 3008F BODY MASS INDEX DOCD: CPT | Performed by: FAMILY MEDICINE

## 2021-04-21 PROCEDURE — 99213 OFFICE O/P EST LOW 20 MIN: CPT | Performed by: FAMILY MEDICINE

## 2021-04-21 PROCEDURE — 3074F SYST BP LT 130 MM HG: CPT | Performed by: FAMILY MEDICINE

## 2021-04-21 PROCEDURE — 3078F DIAST BP <80 MM HG: CPT | Performed by: FAMILY MEDICINE

## 2021-04-21 RX ORDER — NAPROXEN 500 MG/1
500 TABLET ORAL 2 TIMES DAILY WITH MEALS
Qty: 60 TABLET | Refills: 0 | Status: SHIPPED | OUTPATIENT
Start: 2021-04-21 | End: 2021-05-10

## 2021-04-21 NOTE — PROGRESS NOTES
HPI/Subjective:   Patient ID: Lily Nolasco is a 46year old female. Pt presents with sharp ear pains the last a few seconds that began a few weeks ago. No injury or trauma. No acute illness. Pt does clean ears and uses ear plugs at work.   No drainage or Visit:  Requested Prescriptions     Signed Prescriptions Disp Refills   • naproxen (NAPROSYN) 500 MG Oral Tab 60 tablet 0     Sig: Take 1 tablet (500 mg total) by mouth 2 (two) times daily with meals.  As needed       Imaging & Referrals:  ENT - INTERNAL

## 2021-04-28 ENCOUNTER — TELEPHONE (OUTPATIENT)
Dept: GASTROENTEROLOGY | Facility: CLINIC | Age: 51
End: 2021-04-28

## 2021-04-28 DIAGNOSIS — Z12.11 COLON CANCER SCREENING: Primary | ICD-10-CM

## 2021-04-28 DIAGNOSIS — R11.10 VOMITING, INTRACTABILITY OF VOMITING NOT SPECIFIED, PRESENCE OF NAUSEA NOT SPECIFIED, UNSPECIFIED VOMITING TYPE: ICD-10-CM

## 2021-04-28 DIAGNOSIS — R14.0 BLOATING: ICD-10-CM

## 2021-04-29 NOTE — TELEPHONE ENCOUNTER
Cancelled for:  Colonoscopy [address]  Provider Name:  Dr. Severa Abed  Date:  5/12/21  [facility]  Time:  1000    Prep:  Suprep   Meds/Allergies Reconciled?:  Physician reviewed   Diagnosis with codes:  Colon cancer screening Z12.11, Bloating

## 2021-05-03 ENCOUNTER — TELEPHONE (OUTPATIENT)
Dept: FAMILY MEDICINE CLINIC | Facility: CLINIC | Age: 51
End: 2021-05-03

## 2021-05-03 ENCOUNTER — PATIENT MESSAGE (OUTPATIENT)
Dept: FAMILY MEDICINE CLINIC | Facility: CLINIC | Age: 51
End: 2021-05-03

## 2021-05-05 ENCOUNTER — PATIENT MESSAGE (OUTPATIENT)
Dept: FAMILY MEDICINE CLINIC | Facility: CLINIC | Age: 51
End: 2021-05-05

## 2021-05-05 NOTE — TELEPHONE ENCOUNTER
From: Yessica Marrero  To: Reza Nesbitt MD  Sent: 5/5/2021 10:28 AM CDT  Subject: Other    hi  Earlier i asked you about lab order but there is no urgency for any of my blood test right now so i will do it after September 20th 2021.   Best regards,  Robert Ruby

## 2021-05-10 ENCOUNTER — OFFICE VISIT (OUTPATIENT)
Dept: FAMILY MEDICINE CLINIC | Facility: CLINIC | Age: 51
End: 2021-05-10
Payer: COMMERCIAL

## 2021-05-10 VITALS
BODY MASS INDEX: 20.73 KG/M2 | HEIGHT: 63 IN | DIASTOLIC BLOOD PRESSURE: 70 MMHG | TEMPERATURE: 97 F | WEIGHT: 117 LBS | SYSTOLIC BLOOD PRESSURE: 105 MMHG | HEART RATE: 79 BPM | RESPIRATION RATE: 18 BRPM

## 2021-05-10 DIAGNOSIS — M54.6 DORSALGIA OF THORACIC REGION: Primary | ICD-10-CM

## 2021-05-10 PROCEDURE — 99213 OFFICE O/P EST LOW 20 MIN: CPT | Performed by: FAMILY MEDICINE

## 2021-05-10 PROCEDURE — 3008F BODY MASS INDEX DOCD: CPT | Performed by: FAMILY MEDICINE

## 2021-05-10 PROCEDURE — 3078F DIAST BP <80 MM HG: CPT | Performed by: FAMILY MEDICINE

## 2021-05-10 PROCEDURE — 3074F SYST BP LT 130 MM HG: CPT | Performed by: FAMILY MEDICINE

## 2021-05-10 RX ORDER — NAPROXEN 500 MG/1
500 TABLET ORAL 2 TIMES DAILY WITH MEALS
Qty: 60 TABLET | Refills: 0 | Status: SHIPPED | OUTPATIENT
Start: 2021-05-10

## 2021-05-10 NOTE — PROGRESS NOTES
HPI/Subjective:   Patient ID: John Douglas is a 46year old female. Pt presents with hx of neck and  upper back pains. Has had some aggravation of upper back issues over the last 2-3 days.  states no injury or trauma.  Pt does lift heavy boxes fro INTERNAL

## 2021-05-11 ENCOUNTER — HOSPITAL ENCOUNTER (EMERGENCY)
Facility: HOSPITAL | Age: 51
Discharge: STILL ADMITTED OR ADMINISTRATIVE | End: 2021-05-11
Payer: COMMERCIAL

## 2021-05-20 ENCOUNTER — TELEPHONE (OUTPATIENT)
Dept: GASTROENTEROLOGY | Facility: CLINIC | Age: 51
End: 2021-05-20

## 2021-05-20 DIAGNOSIS — Z12.11 COLON CANCER SCREENING: Primary | ICD-10-CM

## 2021-05-20 DIAGNOSIS — R14.0 BLOATING: ICD-10-CM

## 2021-05-20 DIAGNOSIS — R11.10 VOMITING, INTRACTABILITY OF VOMITING NOT SPECIFIED, PRESENCE OF NAUSEA NOT SPECIFIED, UNSPECIFIED VOMITING TYPE: ICD-10-CM

## 2021-06-01 NOTE — TELEPHONE ENCOUNTER
Scheduled for:  Colonoscopy [address]  Provider Name:  Dr. Ryder Martinez  Date:  8/10/21  [address] [place] [address]  NZGI:  [id number] (CJ is aware to arrive [phone])  Prep:  Suprep, sent new instructions via DVDPlay  Meds/Allergies Reconciled?:  Physician reviewed

## 2021-08-07 ENCOUNTER — PATIENT MESSAGE (OUTPATIENT)
Dept: GASTROENTEROLOGY | Facility: CLINIC | Age: 51
End: 2021-08-07

## 2021-08-07 NOTE — TELEPHONE ENCOUNTER
From: John Douglas  To: Brian Tapia MD  Sent: 8/7/2021 8:50 AM CDT  Subject: Prescription Question    hi  did you send any prescription regarding my colonoscopy procedure on 10th of aug?  do i need covid test before procedure ?  you can talk to my husb

## 2021-08-09 ENCOUNTER — PATIENT MESSAGE (OUTPATIENT)
Dept: GASTROENTEROLOGY | Facility: CLINIC | Age: 51
End: 2021-08-09

## 2021-08-09 RX ORDER — POLYETHYLENE GLYCOL-3350 AND ELECTROLYTES 236; 6.74; 5.86; 2.97; 22.74 G/274.31G; G/274.31G; G/274.31G; G/274.31G; G/274.31G
POWDER, FOR SOLUTION ORAL
Qty: 4000 ML | Refills: 0 | Status: SHIPPED | OUTPATIENT
Start: 2021-08-09

## 2021-08-09 NOTE — TELEPHONE ENCOUNTER
To: Emiliano Root      From: Maciel Mireles      Created: 8/9/2021 1:35 PM        Cesar Worthy,     We have sent the prescription for your prep medication to your [facility] pharmacy on file.  Please call them for pick-up and price details.      Thank you,  Claudene Seltzer RN

## 2021-08-09 NOTE — TELEPHONE ENCOUNTER
From: Radha Tyson  To: Erika Olsen MD  Sent: 8/9/2021 7:53 AM CDT  Subject: Prescription Question    hi  i already checked with the [facility] pharmacy they said nothing sent recently. so you need to send again then she can start taking today.  one more quest

## 2021-08-10 ENCOUNTER — ANESTHESIA (OUTPATIENT)
Dept: ENDOSCOPY | Facility: HOSPITAL | Age: 51
End: 2021-08-10
Payer: COMMERCIAL

## 2021-08-10 ENCOUNTER — ANESTHESIA EVENT (OUTPATIENT)
Dept: ENDOSCOPY | Facility: HOSPITAL | Age: 51
End: 2021-08-10
Payer: COMMERCIAL

## 2021-08-10 ENCOUNTER — HOSPITAL ENCOUNTER (OUTPATIENT)
Facility: HOSPITAL | Age: 51
Setting detail: HOSPITAL OUTPATIENT SURGERY
Discharge: HOME | End: 2021-08-10
Attending: INTERNAL MEDICINE | Admitting: INTERNAL MEDICINE
Payer: COMMERCIAL

## 2021-08-10 VITALS
TEMPERATURE: 98 F | DIASTOLIC BLOOD PRESSURE: 64 MMHG | HEIGHT: 64 IN | WEIGHT: 118 LBS | HEART RATE: 67 BPM | BODY MASS INDEX: 20.14 KG/M2 | RESPIRATION RATE: 19 BRPM | OXYGEN SATURATION: 99 % | SYSTOLIC BLOOD PRESSURE: 114 MMHG

## 2021-08-10 DIAGNOSIS — R14.0 BLOATING: ICD-10-CM

## 2021-08-10 DIAGNOSIS — R11.10 VOMITING, INTRACTABILITY OF VOMITING NOT SPECIFIED, PRESENCE OF NAUSEA NOT SPECIFIED, UNSPECIFIED VOMITING TYPE: ICD-10-CM

## 2021-08-10 DIAGNOSIS — Z12.11 COLON CANCER SCREENING: ICD-10-CM

## 2021-08-10 PROCEDURE — 45385 COLONOSCOPY W/LESION REMOVAL: CPT | Performed by: INTERNAL MEDICINE

## 2021-08-10 PROCEDURE — 0DBN8ZX EXCISION OF SIGMOID COLON, VIA NATURAL OR ARTIFICIAL OPENING ENDOSCOPIC, DIAGNOSTIC: ICD-10-PCS | Performed by: INTERNAL MEDICINE

## 2021-08-10 RX ORDER — SODIUM CHLORIDE, SODIUM LACTATE, POTASSIUM CHLORIDE, CALCIUM CHLORIDE 600; 310; 30; 20 MG/100ML; MG/100ML; MG/100ML; MG/100ML
INJECTION, SOLUTION INTRAVENOUS CONTINUOUS
Status: DISCONTINUED | OUTPATIENT
Start: 2021-08-10 | End: 2021-08-10

## 2021-08-10 RX ORDER — NALOXONE HYDROCHLORIDE 0.4 MG/ML
80 INJECTION, SOLUTION INTRAMUSCULAR; INTRAVENOUS; SUBCUTANEOUS AS NEEDED
Status: DISCONTINUED | OUTPATIENT
Start: 2021-08-10 | End: 2021-08-10

## 2021-08-10 RX ORDER — LIDOCAINE HYDROCHLORIDE 10 MG/ML
INJECTION, SOLUTION EPIDURAL; INFILTRATION; INTRACAUDAL; PERINEURAL AS NEEDED
Status: DISCONTINUED | OUTPATIENT
Start: 2021-08-10 | End: 2021-08-10 | Stop reason: SURG

## 2021-08-10 RX ADMIN — SODIUM CHLORIDE, SODIUM LACTATE, POTASSIUM CHLORIDE, CALCIUM CHLORIDE: 600; 310; 30; 20 INJECTION, SOLUTION INTRAVENOUS at 13:43:00

## 2021-08-10 RX ADMIN — LIDOCAINE HYDROCHLORIDE 50 MG: 10 INJECTION, SOLUTION EPIDURAL; INFILTRATION; INTRACAUDAL; PERINEURAL at 13:20:00

## 2021-08-10 RX ADMIN — SODIUM CHLORIDE, SODIUM LACTATE, POTASSIUM CHLORIDE, CALCIUM CHLORIDE: 600; 310; 30; 20 INJECTION, SOLUTION INTRAVENOUS at 13:17:00

## 2021-08-10 NOTE — ANESTHESIA PREPROCEDURE EVALUATION
Anesthesia PreOp Note    HPI:     Celia Quintero is a 46year old female who presents for preoperative consultation requested by: Alfredo Araujo MD    Date of Surgery: 8/10/2021    Procedure(s):  COLONOSCOPY  Indication: Colon cancer screening, Bloating, V Use      Smoking status: Never Smoker      Smokeless tobacco: Never Used    Vaping Use      Vaping Use: Never used    Substance and Sexual Activity      Alcohol use: Never      Drug use: Never      Sexual activity: Not on file    Other Topics      Concerns anxiety/panic attacks,        GI/Hepatic/Renal    (+) bowel prep    Endo/Other - negative ROS   Abdominal  - normal exam               Anesthesia Plan:   ASA:  2  Plan:   MAC  Informed Consent Plan and Risks Discussed With:  Patient and spouse      I have

## 2021-08-10 NOTE — H&P
History & Physical Examination    Patient Name: Sumi Brantley  MRN: [id number]  CSN: [id number]  YOB: 1970    Diagnosis: screening for colon cancer    MELATONIN OR, Take by mouth., Disp: , Rfl: , 8/9/2021  polyethylene glycol, PEG [id number]-NaB

## 2021-08-10 NOTE — ANESTHESIA POSTPROCEDURE EVALUATION
Patient: Hasmukh Mendez    Procedure Summary     Date: 08/10/21 Room / Location: [address] [place] [address] ENDOSCOPY 01 / [address] [place] [address] ENDOSCOPY    Anesthesia Start: 1446 Anesthesia Stop: 6598    Procedure: COLONOSCOPY (N/A ) Diagnosis:       Colon cancer screening      Bloating      Vomi

## 2021-08-10 NOTE — OPERATIVE REPORT
COLONOSCOPY REPORT    Carolynnadia Siegel     2/15/1970 Age 46year old   PCP Sridhar Calderon MD Endoscopist Alfred Luther MD     Date of procedure: 08/10/21    Procedure: Colonoscopy w/cold snare polypectomy    Pre-operative diagnosis: Screening    Pos showed normal vascular pattern, without evidence of angioectasias or inflammation. 6. TARIQ: normal rectal tone, no masses palpated. Impression:   · One small colon polyp removed, otherwise normal colonoscopy. Recommend:  · Await pathology.  The i

## 2021-08-16 ENCOUNTER — PATIENT MESSAGE (OUTPATIENT)
Dept: GASTROENTEROLOGY | Facility: CLINIC | Age: 51
End: 2021-08-16

## 2021-08-16 NOTE — TELEPHONE ENCOUNTER
Dr. Hu Ruiz,    I contacted patient's , Lukas Madrid, on KEISHA who states she has been having headaches and some vomiting, no fever, since after the cln procedure. States symptoms started 2 days after procedure. Patient took advil with no success.  Patient i

## 2021-08-16 NOTE — TELEPHONE ENCOUNTER
From: José Fitzgerald  To: Conchita Rivas MD  Sent: 8/16/2021 5:20 AM CDT  Subject: Visit Follow-up Question    I have a question about SURGICAL PATHOLOGY TISSUE resulted on 8/11/21 at 11:12 AM. I wasn't feeling well since after the day of my procedure.  I ha OVERNIGHT EVENTS: no acute changes. no pain, slept well, tolerating PO diet of clear liquids     Present Symptoms:     Dyspnea: 0   Nausea/Vomiting: No  Anxiety:  No  Depression: No  Fatigue: No  Loss of appetite: No    Pain: none             Character-            Duration-            Effect-            Factors-            Frequency-            Location-            Severity-    Review of Systems: Reviewed                     Negative: no chest pain            All others negative    MEDICATIONS  (STANDING):  chlorhexidine 4% Liquid 1 Application(s) Topical <User Schedule>  Dakins Solution - 1/2 Strength 1 Application(s) Topical two times a day  enoxaparin Injectable 30 milliGRAM(s) SubCutaneous every 12 hours  latanoprost 0.005% Ophthalmic Solution 1 Drop(s) Both EYES at bedtime  lidocaine 1% Injectable 10 milliLiter(s) Local Injection once  meropenem  IVPB 1000 milliGRAM(s) IV Intermittent every 8 hours  multivitamin/minerals/iron Oral Solution (CENTRUM) 15 milliLiter(s) Oral daily  nystatin Cream 1 Application(s) Topical two times a day  pantoprazole  Injectable 40 milliGRAM(s) IV Push every 12 hours  sodium chloride 0.9% lock flush 3 milliLiter(s) IV Push every 8 hours    MEDICATIONS  (PRN):  acetaminophen   Tablet .. 650 milliGRAM(s) Oral every 6 hours PRN Temp greater or equal to 38C (100.4F), Moderate Pain (4 - 6)  loperamide 2 milliGRAM(s) Oral every 4 hours PRN Diarrhea  sodium chloride 0.9% lock flush 10 milliLiter(s) IV Push every 1 hour PRN Pre/post blood products, medications, blood draw, and to maintain line patency    PHYSICAL EXAM:    Vital Signs Last 24 Hrs  T(C): 36.6 (05 Jun 2020 08:45), Max: 36.7 (04 Jun 2020 15:16)  T(F): 97.8 (05 Jun 2020 08:45), Max: 98.1 (04 Jun 2020 15:16)  HR: 85 (05 Jun 2020 08:45) (82 - 95)  BP: 115/75 (05 Jun 2020 08:45) (101/70 - 115/75)  BP(mean): --  RR: 17 (05 Jun 2020 08:45) (16 - 18)  SpO2: 98% (05 Jun 2020 08:45) (94% - 98%)    General: alert  oriented x 4, cachexia     Karnofsky:  30 %    HEENT: normal      Lungs: comfortable. R chest tube. right SOURAV drain     CV: normal      GI: normal. PEFARSHAD     : beth    MSK: bedbound/wheelchair bound    Skin: no rash    LABS:                      7.7    11.20 )-----------( 640      ( 05 Jun 2020 05:10 )             24.1     06-05    133<L>  |  104  |  22.0<H>  ----------------------------<  94  4.0   |  20.0<L>  |  0.52    Ca    6.9<L>      05 Jun 2020 05:10  Phos  3.0     06-05  Mg     1.6     06-05    TPro  4.9<L>  /  Alb  1.5<L>  /  TBili  <0.2<L>  /  DBili  <0.1  /  AST  26  /  ALT  31  /  AlkPhos  153<H>  06-05    I&O's Summary    04 Jun 2020 07:01  -  05 Jun 2020 07:00  --------------------------------------------------------  IN: 1690 mL / OUT: 1580 mL / NET: 110 mL    05 Jun 2020 07:01  -  05 Jun 2020 11:16  --------------------------------------------------------  IN: 250 mL / OUT: 355 mL / NET: -105 mL    RADIOLOGY & ADDITIONAL STUDIES:    ADVANCE DIRECTIVES: full code

## 2021-08-17 ENCOUNTER — TELEPHONE (OUTPATIENT)
Dept: GASTROENTEROLOGY | Facility: CLINIC | Age: 51
End: 2021-08-17

## 2021-08-17 NOTE — TELEPHONE ENCOUNTER
To: ZACK MEDELLIN CLINICAL STAFF      From: José Fitzgerald      Created: 8/16/2021 9:30 PM        ok i will take that medicine. thanks for your concerned, feeling better today.   but i need a note for my job to show them that  I'm under your observation and be fine

## 2021-08-17 NOTE — TELEPHONE ENCOUNTER
I mailed out Colonoscopy letter to pt  Updated Health Maintenance   Entered 5 year Recall  CLN done on 08/10/2021

## 2021-08-18 NOTE — TELEPHONE ENCOUNTER
Dr. Yesenia Macias,    Per message patient is requesting letter to include the following dates, please advise if ok to generate letter:    ok following dates:-  08/13/21 08/16/21  the note should state that  I'm totally fit & fine nothing serious.   thanks  Kwaku Watts

## 2021-08-23 ENCOUNTER — OFFICE VISIT (OUTPATIENT)
Dept: FAMILY MEDICINE CLINIC | Facility: CLINIC | Age: 51
End: 2021-08-23
Payer: COMMERCIAL

## 2021-08-23 VITALS
OXYGEN SATURATION: 97 % | HEART RATE: 75 BPM | WEIGHT: 119 LBS | DIASTOLIC BLOOD PRESSURE: 75 MMHG | SYSTOLIC BLOOD PRESSURE: 114 MMHG | HEIGHT: 64 IN | RESPIRATION RATE: 16 BRPM | BODY MASS INDEX: 20.32 KG/M2

## 2021-08-23 DIAGNOSIS — R42 VERTIGO: Primary | ICD-10-CM

## 2021-08-23 PROCEDURE — 3074F SYST BP LT 130 MM HG: CPT | Performed by: PHYSICIAN ASSISTANT

## 2021-08-23 PROCEDURE — 3078F DIAST BP <80 MM HG: CPT | Performed by: PHYSICIAN ASSISTANT

## 2021-08-23 PROCEDURE — 3008F BODY MASS INDEX DOCD: CPT | Performed by: PHYSICIAN ASSISTANT

## 2021-08-23 PROCEDURE — 99213 OFFICE O/P EST LOW 20 MIN: CPT | Performed by: PHYSICIAN ASSISTANT

## 2021-08-23 RX ORDER — MECLIZINE HCL 12.5 MG/1
12.5 TABLET ORAL 3 TIMES DAILY PRN
Qty: 30 TABLET | Refills: 0 | Status: SHIPPED | OUTPATIENT
Start: 2021-08-23 | End: 2021-09-02

## 2021-08-23 NOTE — PROGRESS NOTES
HPI:    Patient ID: Shane Ye is a 46year old female. Patient present with dizziness and headaches on the top of the head. Feels like her body is tired. Occurs with standing up, sitting down and laying down. Bending down causes dizziness.  Feels like canal and external ear normal. There is no impacted cerumen. Eyes:      General:         Right eye: No discharge. Left eye: No discharge. Extraocular Movements: Extraocular movements intact.       Conjunctiva/sclera: Conjunctivae normal.

## 2021-08-24 ENCOUNTER — OFFICE VISIT (OUTPATIENT)
Dept: AUDIOLOGY | Facility: CLINIC | Age: 51
End: 2021-08-24
Payer: COMMERCIAL

## 2021-08-24 ENCOUNTER — OFFICE VISIT (OUTPATIENT)
Dept: OTOLARYNGOLOGY | Facility: CLINIC | Age: 51
End: 2021-08-24
Payer: COMMERCIAL

## 2021-08-24 VITALS — BODY MASS INDEX: 20.32 KG/M2 | HEIGHT: 64 IN | WEIGHT: 119 LBS

## 2021-08-24 DIAGNOSIS — R42 DIZZINESS: Primary | ICD-10-CM

## 2021-08-24 PROCEDURE — 92567 TYMPANOMETRY: CPT | Performed by: AUDIOLOGIST

## 2021-08-24 PROCEDURE — 92557 COMPREHENSIVE HEARING TEST: CPT | Performed by: AUDIOLOGIST

## 2021-08-24 PROCEDURE — 3008F BODY MASS INDEX DOCD: CPT | Performed by: OTOLARYNGOLOGY

## 2021-08-24 PROCEDURE — 99203 OFFICE O/P NEW LOW 30 MIN: CPT | Performed by: OTOLARYNGOLOGY

## 2021-08-24 RX ORDER — PREDNISONE 10 MG/1
TABLET ORAL
Qty: 44 TABLET | Refills: 0 | Status: SHIPPED | OUTPATIENT
Start: 2021-08-24

## 2021-08-24 RX ORDER — VALACYCLOVIR HYDROCHLORIDE 500 MG/1
500 TABLET, FILM COATED ORAL EVERY 8 HOURS
Qty: 21 TABLET | Refills: 0 | Status: SHIPPED | OUTPATIENT
Start: 2021-08-24 | End: 2021-08-31

## 2021-08-24 NOTE — PROGRESS NOTES
Breanna Armenta is a 46year old female. Patient presents with:  Dizziness: pt is here today due to having dizziness, this has been off and on for about two weeks      HISTORY OF PRESENT ILLNESS  She presents with 2-week history of sudden onset of dizziness. Respiratory Negative Dyspnea and wheezing. Cardio Negative Chest pain, irregular heartbeat/palpitations and syncope. GI Negative Abdominal pain and diarrhea. Endocrine Negative Cold intolerance and heat intolerance. Neuro Negative Tremors.    Psyc through 5, 4 tablets daily on days  6 and 7, 2 tablets daily on days 8 and 9, One tablet daily on days 10 and 11., Disp: 44 tablet, Rfl: 0  •  Meclizine HCl 12.5 MG Oral Tab, Take 1 tablet (12.5 mg total) by mouth 3 (three) times daily as needed. , Disp: 30

## 2021-08-25 ENCOUNTER — TELEPHONE (OUTPATIENT)
Dept: OTOLARYNGOLOGY | Facility: CLINIC | Age: 51
End: 2021-08-25

## 2021-08-25 NOTE — TELEPHONE ENCOUNTER
Letter generated and sent to patient through [address] Spoke with patient's  and notified him.

## 2021-08-25 NOTE — TELEPHONE ENCOUNTER
Pt needs note for work stating she was off work yesterday due to Drs appt- asking to  when it is ready - please call when ready

## 2021-09-07 ENCOUNTER — OFFICE VISIT (OUTPATIENT)
Dept: OTOLARYNGOLOGY | Facility: CLINIC | Age: 51
End: 2021-09-07
Payer: COMMERCIAL

## 2021-09-07 ENCOUNTER — OFFICE VISIT (OUTPATIENT)
Dept: AUDIOLOGY | Facility: CLINIC | Age: 51
End: 2021-09-07
Payer: COMMERCIAL

## 2021-09-07 VITALS
DIASTOLIC BLOOD PRESSURE: 74 MMHG | HEIGHT: 64 IN | WEIGHT: 119 LBS | HEART RATE: 67 BPM | SYSTOLIC BLOOD PRESSURE: 113 MMHG | BODY MASS INDEX: 20.32 KG/M2

## 2021-09-07 DIAGNOSIS — R42 DIZZINESS: Primary | ICD-10-CM

## 2021-09-07 PROCEDURE — 3074F SYST BP LT 130 MM HG: CPT | Performed by: OTOLARYNGOLOGY

## 2021-09-07 PROCEDURE — 3078F DIAST BP <80 MM HG: CPT | Performed by: OTOLARYNGOLOGY

## 2021-09-07 PROCEDURE — 92567 TYMPANOMETRY: CPT | Performed by: AUDIOLOGIST

## 2021-09-07 PROCEDURE — 99213 OFFICE O/P EST LOW 20 MIN: CPT | Performed by: OTOLARYNGOLOGY

## 2021-09-07 PROCEDURE — 3008F BODY MASS INDEX DOCD: CPT | Performed by: OTOLARYNGOLOGY

## 2021-09-07 PROCEDURE — 92557 COMPREHENSIVE HEARING TEST: CPT | Performed by: AUDIOLOGIST

## 2021-09-16 ENCOUNTER — PATIENT MESSAGE (OUTPATIENT)
Dept: FAMILY MEDICINE CLINIC | Facility: CLINIC | Age: 51
End: 2021-09-16

## 2021-09-16 DIAGNOSIS — Z00.00 ROUTINE PHYSICAL EXAMINATION: Primary | ICD-10-CM

## 2021-09-16 NOTE — TELEPHONE ENCOUNTER
From: Yessica Marrero  To: Reza Nesbitt MD  Sent: 9/16/2021 7:33 AM CDT  Subject: Lab Test    hi  Can you order my routine lab test (blood work) it was done last year 09/19/2020 which is due now.   best regards,  Yessica Marrero

## 2021-09-16 NOTE — PROGRESS NOTES
Tala Galvan is a 46year old female. Patient presents with: Follow - Up: pt presents today due to 2 week f/u on dizziness. pt states sxs has been better now.        HISTORY OF PRESENT ILLNESS  She presents with 2-week history of sudden onset of dizziness Procedure: COLONOSCOPY;  Surgeon: Samy Triplett MD;  Location: [address] [place] [address] ENDOSCOPY         REVIEW OF SYSTEMS    System Neg/Pos Details   Constitutional Negative Fatigue, fever and weight loss. ENMT Negative Drooling.    Eyes Negative Blurred vision and visio Oropharynx - Normal.   Nose/Mouth/Throat Normal Nares - Right: Normal Left: Normal. Septum -Normal  Turbinates - Right: Normal, Left: Normal.       Current Outpatient Medications:   •  predniSONE 10 MG Oral Tab, 6 tablets daily day one through 5, 4 tablets

## 2021-09-23 ENCOUNTER — LAB ENCOUNTER (OUTPATIENT)
Dept: LAB | Age: 51
End: 2021-09-23
Attending: FAMILY MEDICINE
Payer: COMMERCIAL

## 2021-09-23 DIAGNOSIS — Z00.00 ROUTINE PHYSICAL EXAMINATION: ICD-10-CM

## 2021-09-23 LAB
ALBUMIN SERPL-MCNC: 3.6 G/DL (ref 3.4–5)
ALBUMIN/GLOB SERPL: 1.1 {RATIO} (ref 1–2)
ALP LIVER SERPL-CCNC: 67 U/L
ALT SERPL-CCNC: 29 U/L
ANION GAP SERPL CALC-SCNC: 6 MMOL/L (ref 0–18)
AST SERPL-CCNC: 22 U/L (ref 15–37)
BILIRUB SERPL-MCNC: 1 MG/DL (ref 0.1–2)
BUN BLD-MCNC: 10 MG/DL (ref 7–18)
BUN/CREAT SERPL: 15.9 (ref 10–20)
CALCIUM BLD-MCNC: 9.1 MG/DL (ref 8.5–10.1)
CHLORIDE SERPL-SCNC: 108 MMOL/L (ref 98–112)
CHOLEST SERPL-MCNC: 190 MG/DL (ref ?–200)
CO2 SERPL-SCNC: 27 MMOL/L (ref 21–32)
CREAT BLD-MCNC: 0.63 MG/DL
DEPRECATED RDW RBC AUTO: 42.8 FL (ref 35.1–46.3)
ERYTHROCYTE [DISTWIDTH] IN BLOOD BY AUTOMATED COUNT: 13.3 % (ref 11–15)
GLOBULIN PLAS-MCNC: 3.3 G/DL (ref 2.8–4.4)
GLUCOSE BLD-MCNC: 81 MG/DL (ref 70–99)
HCT VFR BLD AUTO: 42.5 %
HDLC SERPL-MCNC: 63 MG/DL (ref 40–59)
HGB BLD-MCNC: 13.5 G/DL
LDLC SERPL CALC-MCNC: 111 MG/DL (ref ?–100)
MCH RBC QN AUTO: 27.8 PG (ref 26–34)
MCHC RBC AUTO-ENTMCNC: 31.8 G/DL (ref 31–37)
MCV RBC AUTO: 87.6 FL
NONHDLC SERPL-MCNC: 127 MG/DL (ref ?–130)
OSMOLALITY SERPL CALC.SUM OF ELEC: 290 MOSM/KG (ref 275–295)
PATIENT FASTING Y/N/NP: YES
PATIENT FASTING Y/N/NP: YES
PLATELET # BLD AUTO: 236 10(3)UL (ref 150–450)
POTASSIUM SERPL-SCNC: 4.3 MMOL/L (ref 3.5–5.1)
PROT SERPL-MCNC: 6.9 G/DL (ref 6.4–8.2)
RBC # BLD AUTO: 4.85 X10(6)UL
SODIUM SERPL-SCNC: 141 MMOL/L (ref 136–145)
TRIGL SERPL-MCNC: 87 MG/DL (ref 30–149)
TSI SER-ACNC: 0.59 MIU/ML (ref 0.36–3.74)
VLDLC SERPL CALC-MCNC: 15 MG/DL (ref 0–30)
WBC # BLD AUTO: 4.1 X10(3) UL (ref 4–11)

## 2021-09-23 PROCEDURE — 80061 LIPID PANEL: CPT

## 2021-09-23 PROCEDURE — 36415 COLL VENOUS BLD VENIPUNCTURE: CPT

## 2021-09-23 PROCEDURE — 85027 COMPLETE CBC AUTOMATED: CPT

## 2021-09-23 PROCEDURE — 80053 COMPREHEN METABOLIC PANEL: CPT

## 2021-09-23 PROCEDURE — 84443 ASSAY THYROID STIM HORMONE: CPT

## 2022-04-21 ENCOUNTER — HOSPITAL ENCOUNTER (OUTPATIENT)
Age: 52
Discharge: HOME | End: 2022-04-21
Payer: COMMERCIAL

## 2022-04-21 VITALS
BODY MASS INDEX: 20.14 KG/M2 | RESPIRATION RATE: 18 BRPM | TEMPERATURE: 97 F | HEART RATE: 70 BPM | HEIGHT: 64 IN | SYSTOLIC BLOOD PRESSURE: 126 MMHG | OXYGEN SATURATION: 99 % | WEIGHT: 118 LBS | DIASTOLIC BLOOD PRESSURE: 79 MMHG

## 2022-04-21 DIAGNOSIS — Z20.822 ENCOUNTER FOR LABORATORY TESTING FOR COVID-19 VIRUS: Primary | ICD-10-CM

## 2022-04-21 DIAGNOSIS — J06.9 UPPER RESPIRATORY TRACT INFECTION, UNSPECIFIED TYPE: ICD-10-CM

## 2022-04-21 LAB
S PYO AG THROAT QL: NEGATIVE
SARS-COV-2 RNA RESP QL NAA+PROBE: NOT DETECTED

## 2022-04-21 PROCEDURE — 87880 STREP A ASSAY W/OPTIC: CPT | Performed by: NURSE PRACTITIONER

## 2022-04-21 PROCEDURE — 99213 OFFICE O/P EST LOW 20 MIN: CPT | Performed by: NURSE PRACTITIONER

## 2022-04-21 PROCEDURE — U0002 COVID-19 LAB TEST NON-CDC: HCPCS | Performed by: NURSE PRACTITIONER

## 2022-04-21 RX ORDER — ALBUTEROL SULFATE 90 UG/1
2 AEROSOL, METERED RESPIRATORY (INHALATION) EVERY 4 HOURS PRN
Qty: 18 G | Refills: 0 | Status: SHIPPED | OUTPATIENT
Start: 2022-04-21 | End: 2022-05-21

## 2022-04-21 NOTE — ED INITIAL ASSESSMENT (HPI)
Pt presents to the IC with c/o a sore throat and runny nose for the last week. Hx of fever earlier in the week, resolved.

## 2022-06-06 NOTE — TELEPHONE ENCOUNTER
Levofloxacin listed in allergies. Dr. Josie Hickey aware, states okay to give. Pt states \"I'm not allergic, but it irritated my stomach ulcer before. \" Pt states he has taken in the past without issue.     Electronically signed by Mili Bass RN on 6/6/2022 at 11:25 AM Patient is at the imaging center now.  I went ahead and faxed order

## 2022-06-10 ENCOUNTER — OFFICE VISIT (OUTPATIENT)
Dept: FAMILY MEDICINE CLINIC | Facility: CLINIC | Age: 52
End: 2022-06-10
Payer: COMMERCIAL

## 2022-06-10 VITALS
DIASTOLIC BLOOD PRESSURE: 63 MMHG | BODY MASS INDEX: 20.49 KG/M2 | SYSTOLIC BLOOD PRESSURE: 111 MMHG | WEIGHT: 120 LBS | HEIGHT: 64 IN | HEART RATE: 72 BPM

## 2022-06-10 DIAGNOSIS — R51.9 NONINTRACTABLE EPISODIC HEADACHE, UNSPECIFIED HEADACHE TYPE: Primary | ICD-10-CM

## 2022-06-10 PROCEDURE — 3074F SYST BP LT 130 MM HG: CPT | Performed by: FAMILY MEDICINE

## 2022-06-10 PROCEDURE — 3008F BODY MASS INDEX DOCD: CPT | Performed by: FAMILY MEDICINE

## 2022-06-10 PROCEDURE — 3078F DIAST BP <80 MM HG: CPT | Performed by: FAMILY MEDICINE

## 2022-06-10 PROCEDURE — 99214 OFFICE O/P EST MOD 30 MIN: CPT | Performed by: FAMILY MEDICINE

## 2022-06-28 ENCOUNTER — PATIENT MESSAGE (OUTPATIENT)
Dept: FAMILY MEDICINE CLINIC | Facility: CLINIC | Age: 52
End: 2022-06-28

## 2022-06-28 DIAGNOSIS — Z12.31 ENCOUNTER FOR SCREENING MAMMOGRAM FOR BREAST CANCER: Primary | ICD-10-CM

## 2022-07-07 ENCOUNTER — PATIENT MESSAGE (OUTPATIENT)
Dept: FAMILY MEDICINE CLINIC | Facility: CLINIC | Age: 52
End: 2022-07-07

## 2022-07-08 NOTE — TELEPHONE ENCOUNTER
From: Rd Naylor  To: Jerry Miller MD  Sent: 6/28/2022 6:47 AM CDT  Subject: elsa gram order    hi  can u send me prescription for elsa gram which is due now.   best regards,  dR Naylor

## 2022-07-19 ENCOUNTER — PATIENT MESSAGE (OUTPATIENT)
Dept: FAMILY MEDICINE CLINIC | Facility: CLINIC | Age: 52
End: 2022-07-19

## 2022-07-21 RX ORDER — NAPROXEN 500 MG/1
500 TABLET ORAL 2 TIMES DAILY WITH MEALS
Qty: 60 TABLET | Refills: 0 | Status: SHIPPED | OUTPATIENT
Start: 2022-07-21

## 2022-07-21 NOTE — TELEPHONE ENCOUNTER
Message noted: Chart reviewed and may refill medication as requested. Prescription sent to listed pharmacy. Pharmacy to notify patient.  Pt notified through [facility]

## 2022-08-19 RX ORDER — NAPROXEN 500 MG/1
500 TABLET ORAL 2 TIMES DAILY WITH MEALS
Qty: 60 TABLET | Refills: 0 | Status: SHIPPED | OUTPATIENT
Start: 2022-08-19

## 2022-08-19 NOTE — TELEPHONE ENCOUNTER
Message noted: Chart reviewed and may refill medication as requested. Prescription sent to listed pharmacy. Pharmacy to notify patient.  Pt notified through [facility]

## 2022-09-22 ENCOUNTER — OFFICE VISIT (OUTPATIENT)
Dept: FAMILY MEDICINE CLINIC | Facility: CLINIC | Age: 52
End: 2022-09-22

## 2022-09-22 VITALS
TEMPERATURE: 98 F | HEIGHT: 64 IN | HEART RATE: 64 BPM | WEIGHT: 120 LBS | DIASTOLIC BLOOD PRESSURE: 67 MMHG | RESPIRATION RATE: 18 BRPM | SYSTOLIC BLOOD PRESSURE: 102 MMHG | BODY MASS INDEX: 20.49 KG/M2

## 2022-09-22 DIAGNOSIS — J01.90 ACUTE SINUSITIS, RECURRENCE NOT SPECIFIED, UNSPECIFIED LOCATION: ICD-10-CM

## 2022-09-22 DIAGNOSIS — R05.1 ACUTE COUGH: Primary | ICD-10-CM

## 2022-09-22 PROCEDURE — 3008F BODY MASS INDEX DOCD: CPT | Performed by: FAMILY MEDICINE

## 2022-09-22 PROCEDURE — 99213 OFFICE O/P EST LOW 20 MIN: CPT | Performed by: FAMILY MEDICINE

## 2022-09-22 PROCEDURE — 3074F SYST BP LT 130 MM HG: CPT | Performed by: FAMILY MEDICINE

## 2022-09-22 PROCEDURE — 3078F DIAST BP <80 MM HG: CPT | Performed by: FAMILY MEDICINE

## 2022-09-22 RX ORDER — AZITHROMYCIN 250 MG/1
TABLET, FILM COATED ORAL
Qty: 6 TABLET | Refills: 0 | Status: SHIPPED | OUTPATIENT
Start: 2022-09-22 | End: 2022-09-27

## 2022-12-02 ENCOUNTER — PATIENT MESSAGE (OUTPATIENT)
Dept: FAMILY MEDICINE CLINIC | Facility: CLINIC | Age: 52
End: 2022-12-02

## 2022-12-02 DIAGNOSIS — Z00.00 ROUTINE PHYSICAL EXAMINATION: Primary | ICD-10-CM

## 2022-12-03 NOTE — TELEPHONE ENCOUNTER
Dr Albrecht=see message,pended routine blood test.      No future appointments.     From: Noe Barnes  To: Abdirahman Gill MD  Sent: 12/2/2022 10:03 PM CST  Subject: LAB WORK    HI  CAN YOU SEND ORDER FOR MY ROUTINE LAB (BLOOD) WORK     [address]

## 2022-12-16 ENCOUNTER — LAB ENCOUNTER (OUTPATIENT)
Dept: LAB | Age: 52
End: 2022-12-16
Attending: FAMILY MEDICINE
Payer: COMMERCIAL

## 2022-12-16 DIAGNOSIS — Z00.00 ROUTINE PHYSICAL EXAMINATION: ICD-10-CM

## 2022-12-16 LAB
ALBUMIN SERPL-MCNC: 3.6 G/DL (ref 3.4–5)
ALBUMIN/GLOB SERPL: 1.1 {RATIO} (ref 1–2)
ALP LIVER SERPL-CCNC: 68 U/L
ALT SERPL-CCNC: 26 U/L
ANION GAP SERPL CALC-SCNC: 3 MMOL/L (ref 0–18)
AST SERPL-CCNC: 19 U/L (ref 15–37)
BASOPHILS # BLD AUTO: 0.01 X10(3) UL (ref 0–0.2)
BASOPHILS NFR BLD AUTO: 0.2 %
BILIRUB SERPL-MCNC: 0.9 MG/DL (ref 0.1–2)
BUN BLD-MCNC: 17 MG/DL (ref 7–18)
BUN/CREAT SERPL: 23.6 (ref 10–20)
CALCIUM BLD-MCNC: 9.2 MG/DL (ref 8.5–10.1)
CHLORIDE SERPL-SCNC: 107 MMOL/L (ref 98–112)
CHOLEST SERPL-MCNC: 145 MG/DL (ref ?–200)
CO2 SERPL-SCNC: 30 MMOL/L (ref 21–32)
CREAT BLD-MCNC: 0.72 MG/DL
DEPRECATED RDW RBC AUTO: 42.2 FL (ref 35.1–46.3)
EOSINOPHIL # BLD AUTO: 0.02 X10(3) UL (ref 0–0.7)
EOSINOPHIL NFR BLD AUTO: 0.4 %
ERYTHROCYTE [DISTWIDTH] IN BLOOD BY AUTOMATED COUNT: 13.2 % (ref 11–15)
FASTING PATIENT LIPID ANSWER: YES
FASTING STATUS PATIENT QL REPORTED: YES
GFR SERPLBLD BASED ON 1.73 SQ M-ARVRAT: 101 ML/MIN/1.73M2 (ref 60–?)
GLOBULIN PLAS-MCNC: 3.2 G/DL (ref 2.8–4.4)
GLUCOSE BLD-MCNC: 83 MG/DL (ref 70–99)
HCT VFR BLD AUTO: 42.4 %
HDLC SERPL-MCNC: 65 MG/DL (ref 40–59)
HGB BLD-MCNC: 13.7 G/DL
IMM GRANULOCYTES # BLD AUTO: 0 X10(3) UL (ref 0–1)
IMM GRANULOCYTES NFR BLD: 0 %
LDLC SERPL CALC-MCNC: 61 MG/DL (ref ?–100)
LYMPHOCYTES # BLD AUTO: 2.25 X10(3) UL (ref 1–4)
LYMPHOCYTES NFR BLD AUTO: 44.2 %
MCH RBC QN AUTO: 28.2 PG (ref 26–34)
MCHC RBC AUTO-ENTMCNC: 32.3 G/DL (ref 31–37)
MCV RBC AUTO: 87.4 FL
MONOCYTES # BLD AUTO: 0.38 X10(3) UL (ref 0.1–1)
MONOCYTES NFR BLD AUTO: 7.5 %
NEUTROPHILS # BLD AUTO: 2.43 X10 (3) UL (ref 1.5–7.7)
NEUTROPHILS # BLD AUTO: 2.43 X10(3) UL (ref 1.5–7.7)
NEUTROPHILS NFR BLD AUTO: 47.7 %
NONHDLC SERPL-MCNC: 80 MG/DL (ref ?–130)
OSMOLALITY SERPL CALC.SUM OF ELEC: 291 MOSM/KG (ref 275–295)
PLATELET # BLD AUTO: 205 10(3)UL (ref 150–450)
POTASSIUM SERPL-SCNC: 4.5 MMOL/L (ref 3.5–5.1)
PROT SERPL-MCNC: 6.8 G/DL (ref 6.4–8.2)
RBC # BLD AUTO: 4.85 X10(6)UL
SODIUM SERPL-SCNC: 140 MMOL/L (ref 136–145)
TRIGL SERPL-MCNC: 105 MG/DL (ref 30–149)
TSI SER-ACNC: 1.08 MIU/ML (ref 0.36–3.74)
VLDLC SERPL CALC-MCNC: 16 MG/DL (ref 0–30)
WBC # BLD AUTO: 5.1 X10(3) UL (ref 4–11)

## 2022-12-16 PROCEDURE — 85025 COMPLETE CBC W/AUTO DIFF WBC: CPT

## 2022-12-16 PROCEDURE — 84443 ASSAY THYROID STIM HORMONE: CPT

## 2022-12-16 PROCEDURE — 80061 LIPID PANEL: CPT

## 2022-12-16 PROCEDURE — 80053 COMPREHEN METABOLIC PANEL: CPT

## 2022-12-16 PROCEDURE — 36415 COLL VENOUS BLD VENIPUNCTURE: CPT

## 2023-01-06 ENCOUNTER — HOSPITAL ENCOUNTER (OUTPATIENT)
Dept: GENERAL RADIOLOGY | Age: 53
Discharge: HOME | End: 2023-01-06
Attending: FAMILY MEDICINE

## 2023-01-06 ENCOUNTER — OFFICE VISIT (OUTPATIENT)
Dept: OCCUPATIONAL MEDICINE | Age: 53
End: 2023-01-06
Attending: FAMILY MEDICINE

## 2023-01-06 DIAGNOSIS — S49.91XA RIGHT SHOULDER INJURY, INITIAL ENCOUNTER: ICD-10-CM

## 2023-01-06 DIAGNOSIS — S49.91XA RIGHT SHOULDER INJURY, INITIAL ENCOUNTER: Primary | ICD-10-CM

## 2023-01-06 PROCEDURE — 73030 X-RAY EXAM OF SHOULDER: CPT | Performed by: FAMILY MEDICINE

## 2023-01-09 ENCOUNTER — OFFICE VISIT (OUTPATIENT)
Dept: FAMILY MEDICINE CLINIC | Facility: CLINIC | Age: 53
End: 2023-01-09
Payer: COMMERCIAL

## 2023-01-09 VITALS
DIASTOLIC BLOOD PRESSURE: 70 MMHG | SYSTOLIC BLOOD PRESSURE: 107 MMHG | RESPIRATION RATE: 16 BRPM | HEART RATE: 82 BPM | HEIGHT: 64 IN | WEIGHT: 120 LBS | TEMPERATURE: 98 F | BODY MASS INDEX: 20.49 KG/M2

## 2023-01-09 DIAGNOSIS — M25.511 ACUTE PAIN OF RIGHT SHOULDER: Primary | ICD-10-CM

## 2023-01-09 PROCEDURE — 3008F BODY MASS INDEX DOCD: CPT | Performed by: FAMILY MEDICINE

## 2023-01-09 PROCEDURE — 3074F SYST BP LT 130 MM HG: CPT | Performed by: FAMILY MEDICINE

## 2023-01-09 PROCEDURE — 3078F DIAST BP <80 MM HG: CPT | Performed by: FAMILY MEDICINE

## 2023-01-09 PROCEDURE — 99213 OFFICE O/P EST LOW 20 MIN: CPT | Performed by: FAMILY MEDICINE

## 2023-01-10 ENCOUNTER — MED REC SCAN ONLY (OUTPATIENT)
Dept: PHYSICAL MEDICINE AND REHAB | Facility: CLINIC | Age: 53
End: 2023-01-10

## 2023-01-10 ENCOUNTER — OFFICE VISIT (OUTPATIENT)
Dept: PHYSICAL MEDICINE AND REHAB | Facility: CLINIC | Age: 53
End: 2023-01-10
Payer: COMMERCIAL

## 2023-01-10 VITALS
HEIGHT: 64 IN | WEIGHT: 120 LBS | BODY MASS INDEX: 20.49 KG/M2 | SYSTOLIC BLOOD PRESSURE: 118 MMHG | DIASTOLIC BLOOD PRESSURE: 74 MMHG | OXYGEN SATURATION: 98 % | HEART RATE: 89 BPM

## 2023-01-10 DIAGNOSIS — M75.01 ADHESIVE CAPSULITIS OF RIGHT SHOULDER: Primary | ICD-10-CM

## 2023-01-10 PROCEDURE — 20611 DRAIN/INJ JOINT/BURSA W/US: CPT | Performed by: PHYSICAL MEDICINE & REHABILITATION

## 2023-01-10 PROCEDURE — 3008F BODY MASS INDEX DOCD: CPT | Performed by: PHYSICAL MEDICINE & REHABILITATION

## 2023-01-10 PROCEDURE — 99244 OFF/OP CNSLTJ NEW/EST MOD 40: CPT | Performed by: PHYSICAL MEDICINE & REHABILITATION

## 2023-01-10 PROCEDURE — 3078F DIAST BP <80 MM HG: CPT | Performed by: PHYSICAL MEDICINE & REHABILITATION

## 2023-01-10 PROCEDURE — 3074F SYST BP LT 130 MM HG: CPT | Performed by: PHYSICAL MEDICINE & REHABILITATION

## 2023-01-10 RX ORDER — TRIAMCINOLONE ACETONIDE 40 MG/ML
40 INJECTION, SUSPENSION INTRA-ARTICULAR; INTRAMUSCULAR ONCE
Status: COMPLETED | OUTPATIENT
Start: 2023-01-10 | End: 2023-01-10

## 2023-01-10 RX ORDER — LIDOCAINE HYDROCHLORIDE 10 MG/ML
7 INJECTION, SOLUTION INFILTRATION; PERINEURAL ONCE
Status: COMPLETED | OUTPATIENT
Start: 2023-01-10 | End: 2023-01-10

## 2023-01-10 RX ORDER — MELOXICAM 15 MG/1
15 TABLET ORAL DAILY
Qty: 14 TABLET | Refills: 0 | Status: SHIPPED | OUTPATIENT
Start: 2023-01-10 | End: 2023-01-24

## 2023-01-10 RX ORDER — NAPROXEN 500 MG/1
500 TABLET ORAL 2 TIMES DAILY PRN
COMMUNITY

## 2023-01-10 NOTE — PROCEDURES
Procedure:  Ultrasound guided RIGHT glenohumeral joint aspiration and injection with corticosteroid  The risks, benefits and anticipated outcomes of the procedure, the risks and benefits of the alternatives to the procedure, and the roles and tasks of the personnel to be involved, were discussed with the patient, and the patient consents to the procedure and agrees to proceed. UNIVERSAL PROTOCOL / SAFETY CHECKLIST  Sign in Communication: Completed  Time Out:  Team Confirms the Correct Patient, Correct Procedure, Correct Site and Site Marking, Correct Position (if applicable), Prep and Dry Time (if applicable). Affirmation of Time Out: YES    Sign Out Discussion: Completed if applicable  The procedure was carried out under sterile prep with sterile gel. A 27 ga 1&1/2in needle was introduced and advanced with ultrasound guidance for skin anesthesia with 3 cc of 1% lidocaine. Then, again with real time ultrasound guidance, a 22 gauge 3.5 in needle was advanced under real time US guidance into the posterior glenohumeral joint deep to the labrum. Following negative aspiration, a mixture of 4 cc of 1% lidocaine and 1 cc of Kenalog (40mg/ml) was injected. Permanent images were taken and stored in the PACS system. Ultrasound interpretation was performed prior to the procedure to identify the target and any adjacent neurovascular structures. Subsequently, interpretation was performed during real-time needle guidance confirming placement. Post-intervention interpretation was also performed confirming appropriate injectate flow and hemostasis. The patient tolerated the procedure without complication and was instructed in post-procedure precautions. See patient instructions.      Jos Paiz DO, FAAPMR & CAQSM  Physical Medicine and Rehabilitation/Sports Medicine  MEDICAL [facility]

## 2023-01-10 NOTE — PATIENT INSTRUCTIONS
-Start physical therapy and home exercises  -Mobic daily for the next 7-10 days and then as needed  -Ice/Heat as tolerated    Steroid Injection Information  What to expect: The injection contains Lidocaine (which numbs the area) and Kenalog (a steroid which decreases inflammation). You may have pain relief within hours of the injection due to the Lidocaine. The Kenalog can take a couple days, up to a couple weeks, to reach the full effect. It is also possible to have a slight increase in symptoms over the first few days, but that should resolve fairly quickly. How long will the injection last?: The length of response to an injection is variable. Literally a couple weeks to a couple years. The injection will decrease the inflammation and the pain will return if/when the inflammation returns. Activity Recommendations: For the first 24 hours after injection, keep the area clean and dry. It is ok to shower but don't soak in a tub during that time. No vigorous activity such as running or heavy lifting for the first week but other than that you can gradually resume your normal activities immediately. If you have a significant decrease in pain, be careful not to do too much too soon. Again, the key is GRADUAL resumption of activites. Things to look out for: Common injection side effects include soreness at the injection site, bruising, flushing of the face or skin, and a temporary increase in your blood sugars and/or blood pressure. Infection is very rare but please notify my office [facility] for [address] [place] [phone]) if you develop any fevers, drainage from the injection site, or severe increase in pain. If it is the weekend, go to an Emergency Room.

## 2023-01-11 ENCOUNTER — TELEPHONE (OUTPATIENT)
Dept: PHYSICAL MEDICINE AND REHAB | Facility: CLINIC | Age: 53
End: 2023-01-11

## 2023-01-11 NOTE — TELEPHONE ENCOUNTER
Initiated authorization for Ultrasound guided right glenohumeral joint aspiration and injection with corticosteroid CPT 15714+ with Jimmie Bangura at [facility]  Case #[id number]  Status: Approved-authorization is not required per health plan

## 2023-01-30 ENCOUNTER — TELEPHONE (OUTPATIENT)
Dept: FAMILY MEDICINE CLINIC | Facility: CLINIC | Age: 53
End: 2023-01-30

## 2023-02-02 NOTE — TELEPHONE ENCOUNTER
Dr. Kilo Saleh,     Pt is seeking continous FMLA due to pain in R shoulder. Pt states first day out of work was 01/09/23,RTW--pending her recovery (1-12 weeks).    Do you support the request?    Thank you,  [facility]

## 2023-02-02 NOTE — TELEPHONE ENCOUNTER
Attempted to obtain details for pt's FMLA, pt needs a . Pt requested writer call back tomorrow morning.

## 2023-02-03 NOTE — TELEPHONE ENCOUNTER
Dr. Kitty Silverman,     **FMLA continuous start 1/9/23--end: 1-12 weeks pending recovery**     Please sign off on form: FMLA  -Highlight the patient and hit \"Chart\" button. -In Chart Review, w/in the Encounter tab - click 1 time on the Telephone call encounter for 01/30/23 Scroll down the telephone encounter.  -Click \"scan on\" blue Hyperlink under \"Media\" heading for FMLA Dr. Kitty Silverman 02/03/23 w/in the telephone enc.  -Click on Acknowledge button at the bottom right corner and left-click onto image, signature stamp appears and drag signature to Provider signature line. Stamp will turn blue. Close window.      Thank you,  Alexander Kiser

## 2023-02-13 ENCOUNTER — TELEPHONE (OUTPATIENT)
Dept: FAMILY MEDICINE CLINIC | Facility: CLINIC | Age: 53
End: 2023-02-13

## 2023-02-13 NOTE — TELEPHONE ENCOUNTER
Mitch is calling to give the fax number for Olinda's employer for FMLA papers    Fax# [phone]  ATTN: Maggi Castano  [facility]

## 2023-02-21 ENCOUNTER — OFFICE VISIT (OUTPATIENT)
Dept: PHYSICAL MEDICINE AND REHAB | Facility: CLINIC | Age: 53
End: 2023-02-21
Payer: COMMERCIAL

## 2023-02-21 VITALS
WEIGHT: 120 LBS | HEIGHT: 64 IN | SYSTOLIC BLOOD PRESSURE: 104 MMHG | HEART RATE: 76 BPM | BODY MASS INDEX: 20.49 KG/M2 | DIASTOLIC BLOOD PRESSURE: 78 MMHG | OXYGEN SATURATION: 98 %

## 2023-02-21 DIAGNOSIS — M75.01 ADHESIVE CAPSULITIS OF RIGHT SHOULDER: Primary | ICD-10-CM

## 2023-02-21 PROCEDURE — 99214 OFFICE O/P EST MOD 30 MIN: CPT | Performed by: PHYSICAL MEDICINE & REHABILITATION

## 2023-02-21 PROCEDURE — 3078F DIAST BP <80 MM HG: CPT | Performed by: PHYSICAL MEDICINE & REHABILITATION

## 2023-02-21 PROCEDURE — 3074F SYST BP LT 130 MM HG: CPT | Performed by: PHYSICAL MEDICINE & REHABILITATION

## 2023-02-21 PROCEDURE — 3008F BODY MASS INDEX DOCD: CPT | Performed by: PHYSICAL MEDICINE & REHABILITATION

## 2023-02-21 RX ORDER — MELOXICAM 15 MG/1
15 TABLET ORAL DAILY
Qty: 14 TABLET | Refills: 0 | Status: SHIPPED | OUTPATIENT
Start: 2023-02-21 | End: 2023-03-07

## 2023-03-06 ENCOUNTER — MED REC SCAN ONLY (OUTPATIENT)
Dept: PHYSICAL MEDICINE AND REHAB | Facility: CLINIC | Age: 53
End: 2023-03-06

## 2023-03-21 ENCOUNTER — OFFICE VISIT (OUTPATIENT)
Dept: PHYSICAL MEDICINE AND REHAB | Facility: CLINIC | Age: 53
End: 2023-03-21
Payer: COMMERCIAL

## 2023-03-21 VITALS — HEART RATE: 75 BPM | DIASTOLIC BLOOD PRESSURE: 78 MMHG | OXYGEN SATURATION: 97 % | SYSTOLIC BLOOD PRESSURE: 110 MMHG

## 2023-03-21 DIAGNOSIS — M75.01 ADHESIVE CAPSULITIS OF RIGHT SHOULDER: Primary | ICD-10-CM

## 2023-03-21 PROCEDURE — 3078F DIAST BP <80 MM HG: CPT | Performed by: PHYSICAL MEDICINE & REHABILITATION

## 2023-03-21 PROCEDURE — 99214 OFFICE O/P EST MOD 30 MIN: CPT | Performed by: PHYSICAL MEDICINE & REHABILITATION

## 2023-03-21 PROCEDURE — 3074F SYST BP LT 130 MM HG: CPT | Performed by: PHYSICAL MEDICINE & REHABILITATION

## 2023-03-21 RX ORDER — MELOXICAM 15 MG/1
15 TABLET ORAL DAILY
Qty: 14 TABLET | Refills: 0 | Status: SHIPPED | OUTPATIENT
Start: 2023-03-21 | End: 2023-04-04

## 2023-03-23 ENCOUNTER — PATIENT MESSAGE (OUTPATIENT)
Dept: FAMILY MEDICINE CLINIC | Facility: CLINIC | Age: 53
End: 2023-03-23

## 2023-03-23 NOTE — TELEPHONE ENCOUNTER
Ly Boucher RN 3/23/2023 4:13 PM CDT        ----- Message -----  From: CHIKI Hartman  Sent: 3/23/2023 4:06 PM CDT  To: Em Rn Triage  Subject: FW: Knee X-ray       ----- Message -----  From: Ly Boucher RN  Sent: 3/23/2023 1:29 PM CDT  To: CHIKI Alonso  Subject: FW: Knee X-ray         ----- Message -----  From: Monique Chavez  Sent: 3/23/2023 11:55 AM CDT  To: Em Triage Support  Subject: Knee X-ray     Hi  This is Rosie Flores  Sometimes I feel sharp pain in my left lower knee so I want to go for X-rays just to make sure everything is good but for that I need order for the same if you can send.   Thank you   Rosie Flores

## 2023-03-26 ENCOUNTER — HOSPITAL ENCOUNTER (OUTPATIENT)
Age: 53
Discharge: HOME | End: 2023-03-26
Payer: COMMERCIAL

## 2023-03-26 VITALS
RESPIRATION RATE: 22 BRPM | WEIGHT: 132 LBS | DIASTOLIC BLOOD PRESSURE: 63 MMHG | HEART RATE: 97 BPM | OXYGEN SATURATION: 99 % | HEIGHT: 60 IN | TEMPERATURE: 98 F | BODY MASS INDEX: 25.91 KG/M2 | SYSTOLIC BLOOD PRESSURE: 140 MMHG

## 2023-03-26 DIAGNOSIS — Z20.822 ENCOUNTER FOR LABORATORY TESTING FOR COVID-19 VIRUS: Primary | ICD-10-CM

## 2023-03-26 DIAGNOSIS — U07.1 COVID-19: ICD-10-CM

## 2023-03-26 DIAGNOSIS — J02.9 SORE THROAT: ICD-10-CM

## 2023-03-26 LAB
S PYO AG THROAT QL: NEGATIVE
SARS-COV-2 RNA RESP QL NAA+PROBE: DETECTED

## 2023-04-25 ENCOUNTER — OFFICE VISIT (OUTPATIENT)
Dept: PHYSICAL MEDICINE AND REHAB | Facility: CLINIC | Age: 53
End: 2023-04-25
Payer: COMMERCIAL

## 2023-04-25 VITALS
DIASTOLIC BLOOD PRESSURE: 78 MMHG | HEART RATE: 89 BPM | SYSTOLIC BLOOD PRESSURE: 122 MMHG | OXYGEN SATURATION: 98 % | WEIGHT: 132 LBS | HEIGHT: 60 IN | BODY MASS INDEX: 25.91 KG/M2

## 2023-04-25 DIAGNOSIS — M75.01 ADHESIVE CAPSULITIS OF RIGHT SHOULDER: Primary | ICD-10-CM

## 2023-04-25 PROCEDURE — 3074F SYST BP LT 130 MM HG: CPT | Performed by: PHYSICAL MEDICINE & REHABILITATION

## 2023-04-25 PROCEDURE — 99214 OFFICE O/P EST MOD 30 MIN: CPT | Performed by: PHYSICAL MEDICINE & REHABILITATION

## 2023-04-25 PROCEDURE — 3078F DIAST BP <80 MM HG: CPT | Performed by: PHYSICAL MEDICINE & REHABILITATION

## 2023-04-25 PROCEDURE — 3008F BODY MASS INDEX DOCD: CPT | Performed by: PHYSICAL MEDICINE & REHABILITATION

## 2023-05-18 ENCOUNTER — HOSPITAL ENCOUNTER (OUTPATIENT)
Dept: MRI IMAGING | Facility: HOSPITAL | Age: 53
Discharge: HOME | End: 2023-05-18
Attending: PHYSICAL MEDICINE & REHABILITATION
Payer: COMMERCIAL

## 2023-05-18 DIAGNOSIS — M75.01 ADHESIVE CAPSULITIS OF RIGHT SHOULDER: ICD-10-CM

## 2023-05-18 PROCEDURE — 73221 MRI JOINT UPR EXTREM W/O DYE: CPT | Performed by: PHYSICAL MEDICINE & REHABILITATION

## 2023-05-23 ENCOUNTER — OFFICE VISIT (OUTPATIENT)
Dept: PHYSICAL MEDICINE AND REHAB | Facility: CLINIC | Age: 53
End: 2023-05-23
Payer: COMMERCIAL

## 2023-05-23 ENCOUNTER — TELEPHONE (OUTPATIENT)
Dept: PHYSICAL MEDICINE AND REHAB | Facility: CLINIC | Age: 53
End: 2023-05-23

## 2023-05-23 VITALS
OXYGEN SATURATION: 97 % | HEART RATE: 90 BPM | WEIGHT: 132 LBS | HEIGHT: 60 IN | DIASTOLIC BLOOD PRESSURE: 68 MMHG | BODY MASS INDEX: 25.91 KG/M2 | SYSTOLIC BLOOD PRESSURE: 120 MMHG

## 2023-05-23 DIAGNOSIS — M67.911 TENDINOPATHY OF ROTATOR CUFF, RIGHT: Primary | ICD-10-CM

## 2023-05-23 PROCEDURE — 20611 DRAIN/INJ JOINT/BURSA W/US: CPT | Performed by: PHYSICAL MEDICINE & REHABILITATION

## 2023-05-23 PROCEDURE — 3078F DIAST BP <80 MM HG: CPT | Performed by: PHYSICAL MEDICINE & REHABILITATION

## 2023-05-23 PROCEDURE — 3008F BODY MASS INDEX DOCD: CPT | Performed by: PHYSICAL MEDICINE & REHABILITATION

## 2023-05-23 PROCEDURE — 3074F SYST BP LT 130 MM HG: CPT | Performed by: PHYSICAL MEDICINE & REHABILITATION

## 2023-05-23 PROCEDURE — 99214 OFFICE O/P EST MOD 30 MIN: CPT | Performed by: PHYSICAL MEDICINE & REHABILITATION

## 2023-05-23 RX ORDER — TRIAMCINOLONE ACETONIDE 40 MG/ML
40 INJECTION, SUSPENSION INTRA-ARTICULAR; INTRAMUSCULAR ONCE
Status: COMPLETED | OUTPATIENT
Start: 2023-05-23 | End: 2023-05-23

## 2023-05-23 RX ORDER — LIDOCAINE HYDROCHLORIDE 10 MG/ML
7 INJECTION, SOLUTION INFILTRATION; PERINEURAL ONCE
Status: COMPLETED | OUTPATIENT
Start: 2023-05-23 | End: 2023-05-23

## 2023-05-23 RX ADMIN — TRIAMCINOLONE ACETONIDE 40 MG: 40 INJECTION, SUSPENSION INTRA-ARTICULAR; INTRAMUSCULAR at 16:24:00

## 2023-05-23 RX ADMIN — LIDOCAINE HYDROCHLORIDE 7 ML: 10 INJECTION, SOLUTION INFILTRATION; PERINEURAL at 16:24:00

## 2023-05-23 NOTE — PROCEDURES
Procedure:  Ultrasound guided right subacromial bursa aspiration and injection with corticosteroid  The risks, benefits and anticipated outcomes of the procedure, the risks and benefits of the alternatives to the procedure, and the roles and tasks of the personnel to be involved, were discussed with the patient, and the patient consents to the procedure and agrees to proceed. UNIVERSAL PROTOCOL / SAFETY CHECKLIST  Sign in Communication: Completed  Time Out:  Team Confirms the Correct Patient, Correct Procedure, Correct Site and Site Marking, Correct Position (if applicable), Prep and Dry Time (if applicable). Affirmation of Time Out: YES  Sign Out Discussion: Completed if applicable  The procedure was carried out under sterile prep with sterile gel. A 27 ga 1&1/2in needle was introduced and advanced with ultrasound guidance for skin anesthesia with 3 cc of 1% lidocaine. Then, again with real time ultrasound guidance, a  22 gauge 1.5 in needle was advanced under real time US guidance using in plane approach with the transducer in transverse orientation until the needle tip was visualized to enter the subacromial bursa. Following negative aspiration, a mixture of 3 cc of 1% lidocaine and 1 cc of Kenalog (40mg/ml) was injected. The needle was withdrawn without any complications. Permanent images were taken and stored in the PACS system. Ultrasound interpretation was performed prior to the procedure to identify the target and any adjacent neurovascular structures. Subsequently, interpretation was performed during real-time needle guidance confirming placement. Post-intervention interpretation was also performed confirming appropriate injectate flow and hemostasis. The patient tolerated the procedure without complication and was instructed in post-procedure precautions. Please see patient instructions.     Amy Hwang DO, FAAPMR & CAQSM  Physical Medicine and Rehabilitation/Sports Medicine  [place] [address] [facility]

## 2023-05-23 NOTE — TELEPHONE ENCOUNTER
Initiated authorization for Ultrasound guided right subacromial bursa injection with corticosteroid CPT 07451,  with [employer]  Status: Approved-authorization is not required per health plan          inj done in office

## 2023-05-23 NOTE — PATIENT INSTRUCTIONS
Steroid Injection Information  What to expect: The injection contains Lidocaine (which numbs the area) and Kenalog (a steroid which decreases inflammation). You may have pain relief within hours of the injection due to the Lidocaine. The Kenalog can take a couple days, up to a couple weeks, to reach the full effect. It is also possible to have a slight increase in symptoms over the first few days, but that should resolve fairly quickly. How long will the injection last?: The length of response to an injection is variable. Literally a couple weeks to a couple years. The injection will decrease the inflammation and the pain will return if/when the inflammation returns. Activity Recommendations: For the first 24 hours after injection, keep the area clean and dry. It is ok to shower but don't soak in a tub during that time. No vigorous activity such as running or heavy lifting for the first week but other than that you can gradually resume your normal activities immediately. If you have a significant decrease in pain, be careful not to do too much too soon. Again, the key is GRADUAL resumption of activites. Things to look out for: Common injection side effects include soreness at the injection site, bruising, flushing of the face or skin, and a temporary increase in your blood sugars and/or blood pressure. Infection is very rare but please notify my office [facility] for [address] [phone]) if you develop any fevers, drainage from the injection site, or severe increase in pain. If it is the weekend, go to an Emergency Room.       4 weeks video follow up

## 2023-06-01 ENCOUNTER — TELEPHONE (OUTPATIENT)
Dept: FAMILY MEDICINE CLINIC | Facility: CLINIC | Age: 53
End: 2023-06-01

## 2023-06-01 NOTE — TELEPHONE ENCOUNTER
Patient dropped off STD from [address] [place] [address].  Fee and [employer] received. Emailed to forms.  Sending original.    [facility] Number [id number]  Control Number [id number]

## 2023-06-05 ENCOUNTER — MED REC SCAN ONLY (OUTPATIENT)
Dept: PHYSICAL MEDICINE AND REHAB | Facility: CLINIC | Age: 53
End: 2023-06-05

## 2023-06-07 ENCOUNTER — TELEPHONE (OUTPATIENT)
Dept: PHYSICAL MEDICINE AND REHAB | Facility: CLINIC | Age: 53
End: 2023-06-07

## 2023-06-07 NOTE — TELEPHONE ENCOUNTER
S/w patient's  regarding pt's paperwork. Informed pt's  that Dr. Denton Baumgarten office does not have paperwork. This RN asked pt's  when this paperwork was sent and he stated \"about a week ago. \" Informed pt's  the only documentation from that time was from Dr. Olya Gutierrez office. Pt's  insisting he paid the fee with Dr. Denton Baumgarten office, again this RN informed him that I can see documentation that a fee was paid for paperwork but it was from Dr. Olya Gutierrez office not Dr. Denton Baumgarten. Pt's  verbalized understanding and will clear this up with Dr. Olya santoyo and the patient. This RN  Informed pt's  that if he would like our office to fill out the paperwork, we would be more than happy to, but we do not have her paperwork at this time.

## 2023-07-11 ENCOUNTER — OFFICE VISIT (OUTPATIENT)
Dept: PHYSICAL MEDICINE AND REHAB | Facility: CLINIC | Age: 53
End: 2023-07-11
Payer: COMMERCIAL

## 2023-07-11 VITALS — SYSTOLIC BLOOD PRESSURE: 128 MMHG | HEART RATE: 72 BPM | DIASTOLIC BLOOD PRESSURE: 82 MMHG

## 2023-07-11 DIAGNOSIS — M75.01 ADHESIVE CAPSULITIS OF RIGHT SHOULDER: ICD-10-CM

## 2023-07-11 DIAGNOSIS — M67.911 TENDINOPATHY OF ROTATOR CUFF, RIGHT: Primary | ICD-10-CM

## 2023-07-11 PROCEDURE — 3074F SYST BP LT 130 MM HG: CPT | Performed by: PHYSICAL MEDICINE & REHABILITATION

## 2023-07-11 PROCEDURE — 3079F DIAST BP 80-89 MM HG: CPT | Performed by: PHYSICAL MEDICINE & REHABILITATION

## 2023-07-11 PROCEDURE — 99214 OFFICE O/P EST MOD 30 MIN: CPT | Performed by: PHYSICAL MEDICINE & REHABILITATION

## 2023-07-11 NOTE — PROGRESS NOTES
[address]  FOLLOW UP EVALUATION      Chief Complaint: Shoulder pain    HISTORY OF PRESENT ILLNESS:   Patient presents with: Follow - Up: Patient returns for follow up. LOV 5/23/23 where ultrasound-guided right subacromial bursa injection with corticosteroid was performed. Patient reports 60% improvement. Completed PT but would like to continue. Continues HEP. Denies use of any pain medication. Denies of any T/N or radiation. ROM has improved but still with discomfort when pushing arm backwards. CPL 0/10.     7/11/23  Patient is following up after ultrasound-guided right subacromial bursa injection. She has noted great improvement overall. She denies any pain today. She denies any weakness. She is doing PT and has several more sessions left. She denies any limitations at this point only if she does a sudden sharp movement she can feel some discomfort. Overall, she is very happy with her progress. 5/23/23  Patient is following up for right shoulder pain. She had MRI imaging of the right shoulder which she is to review. She continues to experience pain along the posterolateral aspect. She is saying after stopping PT her range of motion is worsening again. She has difficult time raising her arm overhead or reaching out to the side with reproduction of symptoms. Rates her pain to be 8 out of 10. She denies any radiating symptoms down the arm or any numbness tingling. She denies any fevers or chills. 4/25/23  Patient is following up for right shoulder pain. Pain is continuously present along the posterior lateral aspect. She has difficult time raising her arm overhead. She was unable to start physical therapy as she had COVID. Rates her pain to be 7 out of 10. Denies any further radiating symptoms.   She has been limited with her activity tolerance and functionally as result of this problem for a long time now.    3/21/23  Patient is here for follow-up evaluation. Since last visit, she states that the right shoulder pain range of motion is improved. She still has pain with external rotation towards the end range of motion however this is much better. She states the pain is 5 out of 10 today. She has been attending physical therapy and has 1 more appointment. She denies any radiating symptoms down the arm, any numbness tingling or weakness. Patient is wondering if she should have repeat injection but is also very fearful to have repeat injection. 2/21/23  Patient is here for follow-up evaluation of right shoulder pain. She states great improvement in range of motion. She states the pain is improved greatly as well. However she still has pain rated 5 out of 10 with certain movements specifically with external rotation and internal rotation of the shoulder. Pain is located anterior to posterior deep inside into the joint. She is currently in physical therapy and continues home exercises. She is noticing steady improvement. She denies any radiating symptoms or any changes in strength. She has not taken any medication for the pain at this time. H&P:  The patient is a 48year old right handed female with significant past medical history of tubular adenoma of colon, vertical who presents with right shoulder pain. Patient states pain is been ongoing for the last year with recent progression with reduced range of motion. She states over the last 2 weeks she has had a difficult time raising her arm overhead. 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 She states this acutely exacerbated her symptoms. She states the pain radiates along the anterior to posterior aspect of the shoulder down the arm. She has had x-ray imaging which was negative for any acute abnormality.   She denies any numbness tingling but has more pain at nighttime and difficult time sleeping on her right shoulder. She takes naproxen 500 mg twice daily without any relief. She has not had any physical therapy. PAST MEDICAL HISTORY:     Past Medical History:   Diagnosis Date    Tubular adenoma of colon 2021    repeat CLN in 5 years    Vertigo          PAST SURGICAL HISTORY:     Past Surgical History:   Procedure Laterality Date    COLONOSCOPY N/A 8/10/2021    Procedure: COLONOSCOPY;  Surgeon: Jenny Sepulveda MD;  Location: [facility] ENDOSCOPY    COLONOSCOPY           CURRENT MEDICATIONS:     Current Outpatient Medications   Medication Sig Dispense Refill    MELATONIN OR Take by mouth. ALLERGIES:   No Known Allergies      FAMILY HISTORY:   No family history on file. SOCIAL HISTORY:     Social History     Socioeconomic History    Marital status:    Tobacco Use    Smoking status: Never    Smokeless tobacco: Never   Vaping Use    Vaping Use: Never used   Substance and Sexual Activity    Alcohol use: Never    Drug use: Never          REVIEW OF SYSTEMS:   A comprehensive 10 point review of systems was completed. Pertinent positives and negatives noted in the the HPI. PHYSICAL EXAM:   /82   Pulse 72   General: No immediate distress  Head: Normocephalic/ Atraumatic  Eyes: Extra-occular movements intact. Ears: No auricular hematoma or deformities  Mouth: No lesions or ulcerations  Heart: peripheral pulses intact. Normal capillary refill.    Lungs: Non-labored respirations  Abdomen: No abdominal guarding  Extremities: No lower extremity edema bilaterally   Skin: No lesions noted   Cognition: alert & oriented x 3, attentive, able to follow 2 step commands, comprehention intact, spontaneous speech intact  Psychiatric: Mood and affect appropriate    Gait Normal    Musculoskeletal/Neurological Exam:    SHOULDER: Pertinent positives highlighted in bold    Inspection    Skin No evidence of eythema, warmth, bruising, abrasions, deformity or drooping about bilateral upper extremities   Atrophy No evidence of muscular atrophy   Range of Motion WNL   Forward Flexion    Abduction AROM    Adducted ER AROM    Internal Rotation    Palpation      Tenderness No TTP of the greater tuberosity, bicipital groove, sternoclavicular joint, clavicle, acromioclavicular joint, coracoid and periscapular area   Crepitance Negative palpable/audible subacromial, glenohumeral, and scapulothoracic crepitance   Rotator Cuff    Strength 5/5 empty can; 5/5 ER; 5/5 IR   Impingement Negative Rodriguez  Negative Neers   ABD to 90 with resisted ER without pain (subacromial)  ABD to 90 with resisted IR without pain/weak (intra-articular)       Biceps/Labral Negative Speed's  Negative Atascosa's   Negative Yergason      AC Joint Negative cross arm adduction     Stability Negative sulcus, AP translation, apprehension       LABS:   No results found for: EAG, A1C  Lab Results   Component Value Date    WBC 5.1 12/16/2022    RBC 4.85 12/16/2022    HGB 13.7 12/16/2022    HCT 42.4 12/16/2022    MCV 87.4 12/16/2022    MCH 28.2 12/16/2022    MCHC 32.3 12/16/2022    RDW 13.2 12/16/2022    .0 12/16/2022     Lab Results   Component Value Date    GLU 83 12/16/2022    BUN 17 12/16/2022    BUNCREA 23.6 (H) 12/16/2022    CREATSERUM 0.72 12/16/2022    ANIONGAP 3 12/16/2022    GFRNAA 104 09/23/2021    GFRAA 120 09/23/2021    CA 9.2 12/16/2022    OSMOCALC 291 12/16/2022    ALKPHO 68 12/16/2022    AST 19 12/16/2022    ALT 26 12/16/2022    BILT 0.9 12/16/2022    TP 6.8 12/16/2022    ALB 3.6 12/16/2022    GLOBULIN 3.2 12/16/2022     12/16/2022    K 4.5 12/16/2022     12/16/2022    CO2 30.0 12/16/2022     No results found for: PTP, PT, INR  No results found for: VITD, QVITD, OHLV29GI    IMAGING:   MRI of the right shoulder completed on 5/18/2023    Personally reviewed MRI imaging which is notable for thin intrasubstance supraspinatus tendon tear of the mid fibers with mild Tylenol this. There is no full-thickness rotator cuff tear.   There is type II acromion process and subacromial subdeltoid bursitis    X-ray right shoulder completed on 1/6/2023    Personally reviewed x-ray imaging which is notable for mild degenerative changes of the [facility] joint but no other acute osseous abnormality    All imaging results were reviewed and discussed with patient. ASSESSMENT:     1. Tendinopathy of rotator cuff, right    2. Adhesive capsulitis of right shoulder        Mor Stoll is a pleasant 48-year old female who presents for follow-up evaluation of right shoulder pain. She has responded really well to the subacromial bursa injection. I advised her to continue PT and finish this program over the next several sessions and continue home exercises after. She will use topical treatment occasional NSAID if needed. She will follow-up as needed in the future if her symptoms are more severe. The patient verbalized understanding with the plan and was in agreement. All questions/concerns were addressed and there were no barriers to learning. Nella FLORES. [address]  Physical Medicine and Rehabilitation/Sports Medicine

## 2023-07-18 ENCOUNTER — MED REC SCAN ONLY (OUTPATIENT)
Dept: PHYSICAL MEDICINE AND REHAB | Facility: CLINIC | Age: 53
End: 2023-07-18

## 2023-08-01 ENCOUNTER — OFFICE VISIT (OUTPATIENT)
Dept: FAMILY MEDICINE CLINIC | Facility: CLINIC | Age: 53
End: 2023-08-01

## 2023-08-01 VITALS
SYSTOLIC BLOOD PRESSURE: 115 MMHG | TEMPERATURE: 97 F | HEART RATE: 84 BPM | BODY MASS INDEX: 24.54 KG/M2 | DIASTOLIC BLOOD PRESSURE: 55 MMHG | RESPIRATION RATE: 16 BRPM | WEIGHT: 125 LBS | HEIGHT: 60 IN

## 2023-08-01 DIAGNOSIS — M79.672 LEFT FOOT PAIN: ICD-10-CM

## 2023-08-01 DIAGNOSIS — M79.605 LEFT LEG PAIN: ICD-10-CM

## 2023-08-01 PROCEDURE — 3078F DIAST BP <80 MM HG: CPT | Performed by: FAMILY MEDICINE

## 2023-08-01 PROCEDURE — 3074F SYST BP LT 130 MM HG: CPT | Performed by: FAMILY MEDICINE

## 2023-08-01 PROCEDURE — 99213 OFFICE O/P EST LOW 20 MIN: CPT | Performed by: FAMILY MEDICINE

## 2023-08-01 PROCEDURE — 3008F BODY MASS INDEX DOCD: CPT | Performed by: FAMILY MEDICINE

## 2023-08-01 NOTE — PROGRESS NOTES
Subjective:   Patient ID: Diya Cruz is a 48year old female. Pt presents with some intermittent pain of the left lower leg and left foot. No swelling or bruising. No other trauma or injury. Pt states something did fall on her foot about 1 month ago at [employer]. Symptoms began after this. Pain of top of foot and mid part of leg. History/Other:   Review of Systems  Current Outpatient Medications   Medication Sig Dispense Refill    MELATONIN OR Take by mouth. Allergies:No Known Allergies    Objective:   Physical Exam  Constitutional:       Appearance: Normal appearance. Musculoskeletal:      Comments: Left leg; no sig bruising noted. No swelling. Some discomfort of anterior aspect of leg and top of foot. Neurovascular intact/ normal.      Neurological:      Mental Status: She is alert. Assessment & Plan:   Left leg pain/ Left foot pain: recent injury  - After discussion with patient and , will check xrays of left leg and foot. Discussed medications as needed for symptoms. Follow up and further management after testing. No orders of the defined types were placed in this encounter.       Meds This Visit:  Requested Prescriptions      No prescriptions requested or ordered in this encounter       Imaging & Referrals:  XR FOOT, COMPLETE (MIN 3 VIEWS), LEFT (CPT=73630)  XR TIBIA + FIBULA (2 VIEWS), LEFT (CPT=73590)

## 2023-08-02 ENCOUNTER — HOSPITAL ENCOUNTER (OUTPATIENT)
Dept: GENERAL RADIOLOGY | Facility: HOSPITAL | Age: 53
Discharge: HOME | End: 2023-08-02
Attending: FAMILY MEDICINE
Payer: COMMERCIAL

## 2023-08-02 DIAGNOSIS — M79.672 LEFT FOOT PAIN: ICD-10-CM

## 2023-08-02 DIAGNOSIS — M79.605 LEFT LEG PAIN: ICD-10-CM

## 2023-08-02 PROCEDURE — 73590 X-RAY EXAM OF LOWER LEG: CPT | Performed by: FAMILY MEDICINE

## 2023-08-02 PROCEDURE — 73630 X-RAY EXAM OF FOOT: CPT | Performed by: FAMILY MEDICINE

## 2023-08-08 ENCOUNTER — TELEPHONE (OUTPATIENT)
Dept: PHYSICAL MEDICINE AND REHAB | Facility: CLINIC | Age: 53
End: 2023-08-08

## 2023-08-08 NOTE — TELEPHONE ENCOUNTER
This RN received paperwork from Dr. Greg Styles on 8/7/2023 @ 5:00 pm.  Med Scan Document already had been created, but no encounter. This RN gave copy to PSRs to obtain needed documents from patient and send to the Forms Dept. One copy kept in [employer] as back up.

## 2023-08-08 NOTE — TELEPHONE ENCOUNTER
Forms faxed over from [address] [place] [address]  requesting long term disability documents to be completed document faxed over to forms dept/original sent to [facility] center

## 2023-08-17 ENCOUNTER — OFFICE VISIT (OUTPATIENT)
Dept: FAMILY MEDICINE CLINIC | Facility: CLINIC | Age: 53
End: 2023-08-17

## 2023-08-17 VITALS
HEART RATE: 76 BPM | RESPIRATION RATE: 16 BRPM | WEIGHT: 122 LBS | HEIGHT: 60 IN | SYSTOLIC BLOOD PRESSURE: 105 MMHG | BODY MASS INDEX: 23.95 KG/M2 | TEMPERATURE: 98 F | DIASTOLIC BLOOD PRESSURE: 66 MMHG

## 2023-08-17 DIAGNOSIS — R21 RASH AND NONSPECIFIC SKIN ERUPTION: Primary | ICD-10-CM

## 2023-08-17 PROCEDURE — 3078F DIAST BP <80 MM HG: CPT | Performed by: FAMILY MEDICINE

## 2023-08-17 PROCEDURE — 3008F BODY MASS INDEX DOCD: CPT | Performed by: FAMILY MEDICINE

## 2023-08-17 PROCEDURE — 99213 OFFICE O/P EST LOW 20 MIN: CPT | Performed by: FAMILY MEDICINE

## 2023-08-17 PROCEDURE — 3074F SYST BP LT 130 MM HG: CPT | Performed by: FAMILY MEDICINE

## 2023-08-17 RX ORDER — MOMETASONE FUROATE 1 MG/G
CREAM TOPICAL
Qty: 45 G | Refills: 0 | Status: SHIPPED | OUTPATIENT
Start: 2023-08-17

## 2023-08-17 NOTE — PROGRESS NOTES
Subjective:   Patient ID: Thea Jha is a 48year old female. Pt presents with a itchy rash on the arms and chest area. No new topical agents or ingestions except new soap. Pt has tried otc remedies without consistent relief. No fevers or acute illness. No recent illnesses or sick contacts          History/Other:   Review of Systems  Current Outpatient Medications   Medication Sig Dispense Refill    Mometasone Furoate 0.1 % External Cream Apply a small dab to affected area of skin once per day as needed. 45 g 0    MELATONIN OR Take by mouth. Allergies:No Known Allergies    Objective:   Physical Exam  Constitutional:       Appearance: Normal appearance. Skin:     Comments: Papular rash on right upper chest and also of both upper arms. No vesicles or blisters   Neurological:      Mental Status: She is alert. Assessment & Plan:   Rash and nonspecific skin eruption:  - After discussion with patient/, elocon cream as directed; Discussed over the counter treatments. To stop new soap. To call if worse or not better; Follow up in 1-2 weeks if rash not resolved. Consider follow up with dermatology if not resolved as discussed. No orders of the defined types were placed in this encounter. Meds This Visit:  Requested Prescriptions     Signed Prescriptions Disp Refills    Mometasone Furoate 0.1 % External Cream 45 g 0     Sig: Apply a small dab to affected area of skin once per day as needed.        Imaging & Referrals:  [facility] - INTERNAL

## 2023-08-24 NOTE — TELEPHONE ENCOUNTER
Spk w/ pt and  who expressed after probing for more information decided that at this time they would like to NOT pursue a STD/LTD at this time, asked that we disregard this and any future claims for Disability. Archived req.

## 2023-09-14 ENCOUNTER — TELEPHONE (OUTPATIENT)
Dept: FAMILY MEDICINE CLINIC | Facility: CLINIC | Age: 53
End: 2023-09-14

## 2023-09-14 DIAGNOSIS — Z00.00 ROUTINE PHYSICAL EXAMINATION: Primary | ICD-10-CM

## 2023-09-16 ENCOUNTER — LAB ENCOUNTER (OUTPATIENT)
Dept: LAB | Age: 53
End: 2023-09-16
Attending: FAMILY MEDICINE
Payer: COMMERCIAL

## 2023-09-16 DIAGNOSIS — Z00.00 ROUTINE PHYSICAL EXAMINATION: ICD-10-CM

## 2023-09-16 LAB
ALBUMIN SERPL-MCNC: 3.8 G/DL (ref 3.4–5)
ALBUMIN/GLOB SERPL: 1.1 {RATIO} (ref 1–2)
ALP LIVER SERPL-CCNC: 61 U/L
ALT SERPL-CCNC: 29 U/L
ANION GAP SERPL CALC-SCNC: 4 MMOL/L (ref 0–18)
AST SERPL-CCNC: 18 U/L (ref 15–37)
BILIRUB SERPL-MCNC: 1.3 MG/DL (ref 0.1–2)
BUN BLD-MCNC: 9 MG/DL (ref 7–18)
BUN/CREAT SERPL: 12.2 (ref 10–20)
CALCIUM BLD-MCNC: 9.6 MG/DL (ref 8.5–10.1)
CHLORIDE SERPL-SCNC: 110 MMOL/L (ref 98–112)
CHOLEST SERPL-MCNC: 161 MG/DL (ref ?–200)
CO2 SERPL-SCNC: 28 MMOL/L (ref 21–32)
CREAT BLD-MCNC: 0.74 MG/DL
DEPRECATED RDW RBC AUTO: 43.4 FL (ref 35.1–46.3)
EGFRCR SERPLBLD CKD-EPI 2021: 97 ML/MIN/1.73M2 (ref 60–?)
ERYTHROCYTE [DISTWIDTH] IN BLOOD BY AUTOMATED COUNT: 13.3 % (ref 11–15)
FASTING PATIENT LIPID ANSWER: YES
FASTING STATUS PATIENT QL REPORTED: YES
GLOBULIN PLAS-MCNC: 3.4 G/DL (ref 2.8–4.4)
GLUCOSE BLD-MCNC: 99 MG/DL (ref 70–99)
HCT VFR BLD AUTO: 46.2 %
HDLC SERPL-MCNC: 69 MG/DL (ref 40–59)
HGB BLD-MCNC: 14.7 G/DL
LDLC SERPL CALC-MCNC: 73 MG/DL (ref ?–100)
MCH RBC QN AUTO: 28.2 PG (ref 26–34)
MCHC RBC AUTO-ENTMCNC: 31.8 G/DL (ref 31–37)
MCV RBC AUTO: 88.7 FL
NONHDLC SERPL-MCNC: 92 MG/DL (ref ?–130)
OSMOLALITY SERPL CALC.SUM OF ELEC: 293 MOSM/KG (ref 275–295)
PLATELET # BLD AUTO: 245 10(3)UL (ref 150–450)
POTASSIUM SERPL-SCNC: 4.3 MMOL/L (ref 3.5–5.1)
PROT SERPL-MCNC: 7.2 G/DL (ref 6.4–8.2)
RBC # BLD AUTO: 5.21 X10(6)UL
SODIUM SERPL-SCNC: 142 MMOL/L (ref 136–145)
TRIGL SERPL-MCNC: 107 MG/DL (ref 30–149)
TSI SER-ACNC: 0.83 MIU/ML (ref 0.36–3.74)
VLDLC SERPL CALC-MCNC: 16 MG/DL (ref 0–30)
WBC # BLD AUTO: 4.5 X10(3) UL (ref 4–11)

## 2023-09-16 PROCEDURE — 85027 COMPLETE CBC AUTOMATED: CPT

## 2023-09-16 PROCEDURE — 80061 LIPID PANEL: CPT

## 2023-09-16 PROCEDURE — 84443 ASSAY THYROID STIM HORMONE: CPT

## 2023-09-16 PROCEDURE — 80053 COMPREHEN METABOLIC PANEL: CPT

## 2023-09-16 PROCEDURE — 36415 COLL VENOUS BLD VENIPUNCTURE: CPT

## 2023-09-18 DIAGNOSIS — Z12.31 SCREENING MAMMOGRAM, ENCOUNTER FOR: Primary | ICD-10-CM

## 2023-09-21 ENCOUNTER — OFFICE VISIT (OUTPATIENT)
Dept: FAMILY MEDICINE CLINIC | Facility: CLINIC | Age: 53
End: 2023-09-21

## 2023-09-21 VITALS
HEART RATE: 71 BPM | OXYGEN SATURATION: 100 % | DIASTOLIC BLOOD PRESSURE: 82 MMHG | WEIGHT: 121 LBS | HEIGHT: 60 IN | SYSTOLIC BLOOD PRESSURE: 124 MMHG | BODY MASS INDEX: 23.75 KG/M2

## 2023-09-21 DIAGNOSIS — Z00.00 ROUTINE PHYSICAL EXAMINATION: Primary | ICD-10-CM

## 2023-09-21 DIAGNOSIS — R53.83 FATIGUE, UNSPECIFIED TYPE: ICD-10-CM

## 2023-09-21 PROCEDURE — 3074F SYST BP LT 130 MM HG: CPT | Performed by: FAMILY MEDICINE

## 2023-09-21 PROCEDURE — 3079F DIAST BP 80-89 MM HG: CPT | Performed by: FAMILY MEDICINE

## 2023-09-21 PROCEDURE — 99396 PREV VISIT EST AGE 40-64: CPT | Performed by: FAMILY MEDICINE

## 2023-09-21 PROCEDURE — 3008F BODY MASS INDEX DOCD: CPT | Performed by: FAMILY MEDICINE

## 2023-09-21 NOTE — PROGRESS NOTES
Subjective:   Patient ID: Hanny Aguilar is a 48year old female. Patient is here for routine physical exam. No acute issues. No significant chronic medical problems. Diet and exercise have been fair. Pt has not eating as much as she   Past medical history, family history, and social history were reviewed. Pt had blood work prior to the appointment. Lab work was stable and good. Pt does feel tired. No diarrhea or GI issues. Pt does not take any vitamins and supplements. Discussed with  and can check vitamin testing and also can try an MVIT if desired. History/Other:   Review of Systems   Constitutional:  Positive for fatigue. Negative for fever. HENT: Negative. Eyes: Negative. Respiratory: Negative. Negative for cough, shortness of breath and wheezing. Cardiovascular: Negative. Negative for chest pain. Gastrointestinal: Negative. Negative for abdominal pain, blood in stool, diarrhea and vomiting. Endocrine: Negative. Genitourinary: Negative. Negative for difficulty urinating and dysuria. Musculoskeletal: Negative. Skin: Negative. Allergic/Immunologic: Negative. Neurological: Negative. Negative for dizziness and headaches. Hematological: Negative. Psychiatric/Behavioral: Negative. Negative for dysphoric mood. The patient is not nervous/anxious. Current Outpatient Medications   Medication Sig Dispense Refill    Mometasone Furoate 0.1 % External Cream Apply a small dab to affected area of skin once per day as needed. 45 g 0    MELATONIN OR Take by mouth. Allergies:No Known Allergies    Objective:   Physical Exam  Constitutional:       Appearance: Normal appearance. She is well-developed. HENT:      Head: Normocephalic and atraumatic.       Right Ear: Tympanic membrane and external ear normal.      Left Ear: Tympanic membrane and external ear normal.      Nose: Nose normal.      Mouth/Throat:      Mouth: Mucous membranes are moist.      Pharynx: No posterior oropharyngeal erythema. Eyes:      Conjunctiva/sclera: Conjunctivae normal.   Neck:      Thyroid: No thyroid mass, thyromegaly or thyroid tenderness. Cardiovascular:      Rate and Rhythm: Normal rate and regular rhythm. Pulses: Normal pulses. Heart sounds: Normal heart sounds. Pulmonary:      Effort: Pulmonary effort is normal.      Breath sounds: Normal breath sounds. Abdominal:      General: Abdomen is flat. There is no distension. Palpations: Abdomen is soft. Tenderness: There is no abdominal tenderness. There is no guarding or rebound. Genitourinary:     Vagina: Normal.   Musculoskeletal:         General: Normal range of motion. Cervical back: Normal range of motion and neck supple. Lymphadenopathy:      Cervical: No cervical adenopathy. Skin:     General: Skin is warm. Neurological:      General: No focal deficit present. Mental Status: She is alert and oriented to person, place, and time. Deep Tendon Reflexes: Reflexes are normal and symmetric. Reflexes normal.   Psychiatric:         Mood and Affect: Mood normal.         Behavior: Behavior normal.         Thought Content: Thought content normal.         Judgment: Judgment normal.         Assessment & Plan:   Routine physical examination:  - Exam is unremarkable. Blood tests were discussed and reviewed. Encouraged healthy diet and activity. To call if problems or any significant symptoms. Routine follow up with gyne for well woman exam.    Fatigue:  - After discussion with patient, will check blood work as discussed below; To call if any significant symptoms. Follow up and further management after testing. Discussed taking MVIT. No orders of the defined types were placed in this encounter.       Meds This Visit:  Requested Prescriptions      No prescriptions requested or ordered in this encounter       Imaging & Referrals:  None

## 2024-04-27 ENCOUNTER — HOSPITAL ENCOUNTER (OUTPATIENT)
Age: 54
Discharge: HOME | End: 2024-04-27

## 2024-04-27 VITALS
SYSTOLIC BLOOD PRESSURE: 127 MMHG | OXYGEN SATURATION: 98 % | HEART RATE: 82 BPM | RESPIRATION RATE: 20 BRPM | TEMPERATURE: 99 F | DIASTOLIC BLOOD PRESSURE: 68 MMHG

## 2024-04-27 DIAGNOSIS — R31.9 URINARY TRACT INFECTION WITH HEMATURIA, SITE UNSPECIFIED: Primary | ICD-10-CM

## 2024-04-27 DIAGNOSIS — N39.0 URINARY TRACT INFECTION WITH HEMATURIA, SITE UNSPECIFIED: Primary | ICD-10-CM

## 2024-04-27 LAB
BILIRUB UR QL STRIP: NEGATIVE
COLOR UR: YELLOW
GLUCOSE UR STRIP-MCNC: NEGATIVE MG/DL
KETONES UR STRIP-MCNC: NEGATIVE MG/DL
NITRITE UR QL STRIP: NEGATIVE
PH UR STRIP: 5.5 [PH]
PROT UR STRIP-MCNC: NEGATIVE MG/DL
SP GR UR STRIP: >=1.03
UROBILINOGEN UR STRIP-ACNC: <2 MG/DL

## 2024-04-27 PROCEDURE — 87086 URINE CULTURE/COLONY COUNT: CPT | Performed by: NURSE PRACTITIONER

## 2024-04-27 RX ORDER — NITROFURANTOIN 25; 75 MG/1; MG/1
100 CAPSULE ORAL 2 TIMES DAILY
Qty: 10 CAPSULE | Refills: 0 | Status: SHIPPED | OUTPATIENT
Start: 2024-04-27 | End: 2024-05-02

## 2024-04-27 NOTE — DISCHARGE INSTRUCTIONS
Start the antibiotic as prescribed finish it completely drink a lot of water and urinate whenever you have the urge to urine culture is being sent out if it grows a bacteria showing resistance to the antibiotic prescribed you will be notified antibiotic will be changed.  If you develop fever abdominal pain back pain nausea or vomiting or any new or worsening symptoms go to the nearest emergency department otherwise follow-up with your primary care provider in a week.

## 2024-04-27 NOTE — ED PROVIDER NOTES
Patient Seen in: [facility]      History     Chief Complaint   Patient presents with    Urinary Symptoms     Stated Complaint: Eval-G    Subjective:   HPI    This is a 54-year-old female presenting with urinary symptoms.  Patient's spouse at bedside providing history per patient's request and states that she has had urinary symptoms for about a month and noted some blood in the urine.  Patient's spouse states that she did take over-the-counter Azo which helped with the symptoms but she is still having them and is concerned that she may now have an infection.  Denies any back pain abdominal pain fever nausea or vomiting.    Objective:   Past Medical History:    Tubular adenoma of colon    repeat CLN in 5 years    Vertigo              Past Surgical History:   Procedure Laterality Date    Colonoscopy N/A 8/10/2021    Procedure: COLONOSCOPY;  Surgeon: DAVI Flores MD;  Location: [facility] ENDOSCOPY    Colonoscopy                  Social History     Socioeconomic History    Marital status:    Tobacco Use    Smoking status: Never    Smokeless tobacco: Never   Vaping Use    Vaping status: Never Used   Substance and Sexual Activity    Alcohol use: Never    Drug use: Never              Review of Systems    Positive for stated complaint: Eval-G  Other systems are as noted in HPI.  Constitutional and vital signs reviewed.      All other systems reviewed and negative except as noted above.    Physical Exam     ED Triage Vitals [04/27/24 1010]   /68   Pulse 82   Resp 20   Temp 98.5 °F (36.9 °C)   Temp src Temporal   SpO2 98 %   O2 Device None (Room air)       Current:/68   Pulse 82   Temp 98.5 °F (36.9 °C) (Temporal)   Resp 20   SpO2 98%         Physical Exam  Vitals and nursing note reviewed.   Constitutional:       Appearance: Normal appearance.   HENT:      Right Ear: External ear normal.      Left Ear: External ear normal.      Nose: Nose normal.      Mouth/Throat:      Mouth: Mucous  membranes are moist.      Pharynx: Oropharynx is clear.   Eyes:      Conjunctiva/sclera: Conjunctivae normal.   Abdominal:      Palpations: Abdomen is soft.      Tenderness: There is no abdominal tenderness. There is no right CVA tenderness or left CVA tenderness.   Musculoskeletal:         General: Normal range of motion.      Cervical back: Normal range of motion.   Neurological:      General: No focal deficit present.      Mental Status: She is alert and oriented to person, place, and time.               ED Course     Labs Reviewed   [facility] POCT URINALYSIS DIPSTICK - Abnormal; Notable for the following components:       Result Value    Urine Clarity Slightly cloudy (*)     Blood, Urine Trace-Intact (*)     Leukocyte esterase urine Small (*)     All other components within normal limits   URINE CULTURE, ROUTINE                      MDM          Medical Decision Making  54-year-old female well-appearing and nontoxic with urinary symptoms for a month.  No abdominal tenderness no CVA tenderness no clinical indication for labs or imaging but a urine dip will be collected.  DDx UTI versus interstitial cystitis versus hematuria.  Patient and spouse at bedside patient and spouse are agreeable with this plan of care.    Urine dip is resulted above noting blood and leukocytes concerning for UTI urine culture will be sent out and patient will be started on nitrofurantoin.  Discussed the results with patient and spouse at bedside discussed antibiotic that will be prescribed.  Discussed hydrating well outpatient follow-up with PCP and ER precautions.  Patient and spouse acknowledged understanding discharge instructions.    Problems Addressed:  Urinary tract infection with hematuria, site unspecified: acute illness or injury    Amount and/or Complexity of Data Reviewed  Independent Historian:      Details: Spouse  Labs:  Decision-making details documented in ED Course.    Risk  Prescription drug management.        Disposition and  Plan     Clinical Impression:  1. Urinary tract infection with hematuria, site unspecified         Disposition:  Discharge  4/27/2024 10:24 am    Follow-up:  Darío Albrecht MD  [address] [place] [id number] [phone]    In 1 week            Medications Prescribed:  Current Discharge Medication List        START taking these medications    Details   nitrofurantoin monohydrate macro 100 MG Oral Cap Take 1 capsule (100 mg total) by mouth 2 (two) times daily for 5 days.  Qty: 10 capsule, Refills: 0

## 2024-04-27 NOTE — ED INITIAL ASSESSMENT (HPI)
Pt with pain with urination x1 month. Pt noted a little bit of blood in urine. Pt denied back pain and fevers.

## 2025-02-26 NOTE — TELEPHONE ENCOUNTER
Problem: Patient Centered Care  Goal: Patient preferences are identified and integrated in the patient's plan of care  Description: Interventions:  - What would you like us to know as we care for you? Home w/my daughter  - Provide timely, complete, and accurate information to patient/family  - Incorporate patient and family knowledge, values, beliefs, and cultural backgrounds into the planning and delivery of care  - Encourage patient/family to participate in care and decision-making at the level they choose  - Honor patient and family perspectives and choices  Outcome: Progressing     Problem: Diabetes/Glucose Control  Goal: Glucose maintained within prescribed range  Description: INTERVENTIONS:  - Monitor Blood Glucose as ordered  - Assess for signs and symptoms of hyperglycemia and hypoglycemia  - Administer ordered medications to maintain glucose within target range  - Assess barriers to adequate nutritional intake and initiate nutrition consult as needed  - Instruct patient on self management of diabetes  Outcome: Progressing     Problem: Patient/Family Goals  Goal: Patient/Family Long Term Goal  Description: Patient's Long Term Goal: go back home stronger    Interventions:  - IV abx for UTI  -PT/OT  - See additional Care Plan goals for specific interventions  Outcome: Progressing  Goal: Patient/Family Short Term Goal  Description: Patient's Short Term Goal: No falls    Interventions:   - fall precautions in place  --PT to work with patient   - See additional Care Plan goals for specific interventions  Outcome: Progressing     Problem: RISK FOR INFECTION - ADULT  Goal: Absence of fever/infection during anticipated neutropenic period  Description: INTERVENTIONS  - Monitor WBC  - Administer growth factors as ordered  - Implement neutropenic guidelines  Outcome: Progressing     Problem: SAFETY ADULT - FALL  Goal: Free from fall injury  Description: INTERVENTIONS:  - Assess pt frequently for physical needs  -  Completed & faxed to [phone]    Placed into scanning. Identify cognitive and physical deficits and behaviors that affect risk of falls.  - [place] fall precautions as indicated by assessment.  - Educate pt/family on patient safety including physical limitations  - Instruct pt to call for assistance with activity based on assessment  - Modify environment to reduce risk of injury  - Provide assistive devices as appropriate  - Consider OT/PT consult to assist with strengthening/mobility  - Encourage toileting schedule  Outcome: Progressing     Problem: DISCHARGE PLANNING  Goal: Discharge to home or other facility with appropriate resources  Description: INTERVENTIONS:  - Identify barriers to discharge w/pt and caregiver  - Include patient/family/discharge partner in discharge planning  - Arrange for needed discharge resources and transportation as appropriate  - Identify discharge learning needs (meds, wound care, etc)  - Arrange for interpreters to assist at discharge as needed  - Consider post-discharge preferences of patient/family/discharge partner  - Complete POLST form as appropriate  - Assess patient's ability to be responsible for managing their own health  - Refer to Case Management Department for coordinating discharge planning if the patient needs post-hospital services based on physician/LIP order or complex needs related to functional status, cognitive ability or social support system  Outcome: Progressing

## 2025-07-01 ENCOUNTER — TELEPHONE (OUTPATIENT)
Dept: FAMILY MEDICINE CLINIC | Facility: CLINIC | Age: 55
End: 2025-07-01

## (undated) DEVICE — SNARE ENDOSCOPIC 10MM ROUND

## (undated) DEVICE — LINE MNTR ADLT SET O2 INTMD

## (undated) DEVICE — SNARE OPTMZ PLPCTM TRP

## (undated) DEVICE — MEDI-VAC NON-CONDUCTIVE SUCTION TUBING 6MM X 1.8M (6FT.) L: Brand: CARDINAL HEALTH

## (undated) DEVICE — Device: Brand: CUSTOM PROCEDURE KIT

## (undated) DEVICE — MASK PROC W/VISOR ANTIGLARE

## (undated) DEVICE — 35 ML SYRINGE REGULAR TIP: Brand: MONOJECT

## (undated) NOTE — LETTER
10/27/2020          To Whom It May Concern:    Caro Gaytan is currently under my medical care. Please be advised that the patient would benefit from not working overtime at this time due to underlying medical conditions.   If you require additional inform

## (undated) NOTE — LETTER
11/16/2020          To Whom It May Concern:    Radha Tyson is currently under my medical care.   Please be advised that due to the patient's recent illness and the COVID 19 pandemic, it would be advisable for the patient to self-isolate and quarantine at h

## (undated) NOTE — LETTER
3/5/2019          To Whom It May Concern:    Matilda Lopez is currently under my medical care. Please excuse the patient from work missed as she has been ill. May return to work on Friday, 3/8/19.      If you require additional information please contact

## (undated) NOTE — LETTER
AUTHORIZATION FOR SURGICAL OPERATION OR OTHER PROCEDURE    1. I hereby authorize Dr. Katya Coy and the [facility] staff assigned to my case to perform the following operation and/or procedure at the [facility]:    _______________________________________________________________________________________________    Ultrasound guided right glenohumeral joint aspiration and injection with corticosteroid  _______________________________________________________________________________________________    2. My physician has explained the nature and purpose of the operation or other procedure, possible alternative methods of treatment, the risks involved, and the possibility of complication to me. I acknowledge that no guarantee has been made as to the result that may be obtained. 3.  I recognize that, during the course of this operation, or other procedure, unforseen conditions may necessitate additional or different procedure than those listed above. I, therefore, further authorize and request that the above named physician, his/her physician assistants or designees perform such procedures as are, in his/her professional opinion, necessary and desirable. 4.  Any tissue or organs removed in the operation or other procedure may be disposed of by and at the discretion of the [facility] staff and [facility]. 5.  I understand that in the event of a medical emergency, I will be transported by local paramedics to [facility] or other hospital emergency department. 6.  I certify that I have read and fully understand the above consent to operation and/or other procedure. 7.  I acknowledge that my physician has explained sedation/analgesia administration to me including the risks and benefits. I consent to the administration of sedation/analgesia as may be necessary or desirable in the judgement of my physician.     Witness signature: ___________________________________________________ Date:  ______/______/_____                    Time:  ________ A. M.  P.M. [facility]  2/15/1970  [id number]         Patient signature:  ___________________________________________________        Statement of Physician  My signature below affirms that prior to the time of the procedure, I have explained to the patient and/or his/her guardian, the risks and benefits involved in the proposed treatment and any reasonable alternative to the proposed treatment. I have also explained the risks and benefits involved in the refusal of the proposed treatment and have answered the patient's questions.                         Date:  ______/______/_______  Provider                      Signature:  __________________________________________________________       Time:  ___________ A.M    P.M.

## (undated) NOTE — LETTER
AUTHORIZATION FOR SURGICAL OPERATION OR OTHER PROCEDURE    1. I hereby authorize Dr. Chiqui Higgins and the [facility] staff assigned to my case to perform the following operation and/or procedure at the [facility]:    Right subacromial CSI    2. My physician has explained the nature and purpose of the operation or other procedure, possible alternative methods of treatment, the risks involved, and the possibility of complication to me. I acknowledge that no guarantee has been made as to the result that may be obtained. 3.  I recognize that, during the course of this operation, or other procedure, unforseen conditions may necessitate additional or different procedure than those listed above. I, therefore, further authorize and request that the above named physician, his/her physician assistants or designees perform such procedures as are, in his/her professional opinion, necessary and desirable. 4.  Any tissue or organs removed in the operation or other procedure may be disposed of by and at the discretion of the [facility] staff and [facility] AT [facility]. 5.  I understand that in the event of a medical emergency, I will be transported by local paramedics to [facility] or other hospital emergency department. 6.  I certify that I have read and fully understand the above consent to operation and/or other procedure. 7.  I acknowledge that my physician has explained sedation/analgesia administration to me including the risks and benefits. I consent to the administration of sedation/analgesia as may be necessary or desirable in the judgement of my physician. Witness signature: ___________________________________________________ Date:  ______/______/_____                    Time:  ________ A. M.  P.M.        Patient Name:  Blaise Raygoza  2/15/1970  [id number]         Patient signature:  ___________________________________________________                 Statement of Physician  My signature below affirms that prior to the time of the procedure, I have explained to the patient and/or his/her guardian, the risks and benefits involved in the proposed treatment and any reasonable alternative to the proposed treatment. I have also explained the risks and benefits involved in the refusal of the proposed treatment and have answered the patient's questions.                         Date:  ______/______/_______  Provider                      Signature:  __________________________________________________________       Time:  ___________ A.M    P.M.

## (undated) NOTE — LETTER
01/10/23          Mary Anne Damon  :  2/15/1970      To Whom It May Concern: This patient was seen in our office on 01/10/23 . Work status: May return to work full-time with restrictions as follows: sedentary work duty restriction until follow up appointment with me in 4 weeks. .    May return to work status per above effective 1/10/2023. If this office may be of further assistance, please do not hesitate to contact us.       Sincerely,        Sofia Lesches, DO jprn

## (undated) NOTE — LETTER
Date: January 10, 2023      Patient Name: Anthony Carreon      : 2/15/1970        Thank you for choosing [facility] as your health care provider. Your physician has deemed the following medical service(s) necessary. However, your insurance plan may not pay for all of your health care and costs and may deny payment for this service. The fact that your insurance plan does not pay for an item or service does not mean you should not receive it. The purpose of this form is to help you make an informed decision about whether or not you want to receive this service(s) that may not be paid for by your insurance plan. CPT Code Description     Cost     _________ __Ultrasound guided right glenohumeral joint aspiration and injection with corticosteroid____________________________  _____________      _________ ______________________________ _____________      _________ ______________________________ _____________      I understand that the above mentioned service(s) or supply may not be covered by my insurance company.  I agree to be financially responsible for the cost of this service or supply in the event of my insurance denies payment as a non-covered benefit.        ______________________________________________________________________  Signature of Patient or Patient's Representative  Relationship  Date    ______________________________________________________________________  Signature of Witness to signing of form   Printed Name

## (undated) NOTE — LETTER
5/7/2021              Rakel Alva        [phone]         Dear Kamilla Chua,    This letter is to inform you that our office has made several attempts to reach you by phone without success.   We were attempting to contact

## (undated) NOTE — LETTER
1/9/2023          To Whom It May Concern:    Hanny Aguilar is currently under my medical care. Please excuse the patient from work missed until further notice as the patient has had right shoulder pain. She is in the process of having further evaluation and treatment with a specialist.    If you require additional information please contact our office.         Sincerely,    Deepa Billings MD          Document generated by:  Deepa Billings MD

## (undated) NOTE — LETTER
5/7/2020          To Whom It May Concern:    Matilda Lopez is currently under my medical care.  Her claim number if [id number]  Please be advised that the patient has had some underlying medical issues that will require extending her medical leave to June 27,

## (undated) NOTE — LETTER
12/07/18        Ksenia Nelson  [address] [facility] [address]      Dear Alvaro Huff,    [facility] records indicate that you have outstanding lab work and or testing that was ordered for you and has not yet been completed:  Orders Placed This Encounter

## (undated) NOTE — LETTER
Date & Time: 4/21/2022, 9:51 AM  Patient: Renard Donis  Encounter Provider(s):    CHASE Saba       To Whom It May Concern:    Renard Donis was seen and treated in our department on 4/21/2022. She should not return to work until 7 days.     If you have any questions or concerns, please do not hesitate to call.        _____________________________  Physician/APC Signature

## (undated) NOTE — LETTER
Date: May 23, 2023      Patient Name: Leo Small      : 2/15/1970        Thank you for choosing [facility] as your health care provider. Your physician has deemed the following medical service(s) necessary. However, your insurance plan may not pay for all of your health care and costs and may deny payment for this service. The fact that your insurance plan does not pay for an item or service does not mean you should not receive it. The purpose of this form is to help you make an informed decision about whether or not you want to receive this service(s) that may not be paid for by your insurance plan. CPT Code Description     Cost     Right subacromial CSI  $1200.00      I understand that the above mentioned service(s) or supply may not be covered by my insurance company.  I agree to be financially responsible for the cost of this service or supply in the event of my insurance denies payment as a non-covered benefit.        ______________________________________________________________________  Signature of Patient or Patient's Representative  Relationship  Date    ______________________________________________________________________  Signature of Witness to signing of form   Printed Name

## (undated) NOTE — LETTER
8/25/2021              Hasmukh Mendez        [phone]         To Whom it May Concern,       Hasmukh Mendez is currently under my medical care.  She was seen in my office 8/24/21, so please excuse her from work for t